# Patient Record
Sex: FEMALE | Race: WHITE | Employment: UNEMPLOYED | URBAN - METROPOLITAN AREA
[De-identification: names, ages, dates, MRNs, and addresses within clinical notes are randomized per-mention and may not be internally consistent; named-entity substitution may affect disease eponyms.]

---

## 2017-01-09 RX ORDER — ATORVASTATIN CALCIUM 10 MG/1
TABLET, FILM COATED ORAL
Qty: 30 TABLET | Refills: 11 | Status: SHIPPED | OUTPATIENT
Start: 2017-01-09 | End: 2017-05-01

## 2017-05-01 RX ORDER — ATORVASTATIN CALCIUM 20 MG/1
TABLET, FILM COATED ORAL
Qty: 30 TABLET | Refills: 2 | Status: SHIPPED | OUTPATIENT
Start: 2017-05-01 | End: 2017-07-13 | Stop reason: SDUPTHER

## 2017-05-01 RX ORDER — LEVOTHYROXINE SODIUM 88 UG/1
TABLET ORAL
Qty: 30 TABLET | Refills: 2 | Status: SHIPPED | OUTPATIENT
Start: 2017-05-01 | End: 2017-05-19 | Stop reason: SDUPTHER

## 2017-05-05 RX ORDER — NAPROXEN 500 MG/1
TABLET ORAL
Qty: 60 TABLET | Refills: 3 | OUTPATIENT
Start: 2017-05-05

## 2017-05-19 ENCOUNTER — OFFICE VISIT (OUTPATIENT)
Dept: FAMILY MEDICINE CLINIC | Age: 57
End: 2017-05-19
Payer: MEDICARE

## 2017-05-19 VITALS
TEMPERATURE: 97 F | BODY MASS INDEX: 25.42 KG/M2 | SYSTOLIC BLOOD PRESSURE: 121 MMHG | DIASTOLIC BLOOD PRESSURE: 79 MMHG | WEIGHT: 139 LBS | HEART RATE: 72 BPM | OXYGEN SATURATION: 99 %

## 2017-05-19 DIAGNOSIS — Z12.11 SCREEN FOR COLON CANCER: ICD-10-CM

## 2017-05-19 DIAGNOSIS — Z11.59 NEED FOR HEPATITIS C SCREENING TEST: ICD-10-CM

## 2017-05-19 DIAGNOSIS — R52 GENERALIZED PAIN: ICD-10-CM

## 2017-05-19 DIAGNOSIS — Z11.4 SCREENING FOR HIV (HUMAN IMMUNODEFICIENCY VIRUS): ICD-10-CM

## 2017-05-19 DIAGNOSIS — F17.200 CURRENT SMOKER: ICD-10-CM

## 2017-05-19 DIAGNOSIS — E03.9 HYPOTHYROIDISM, UNSPECIFIED TYPE: Primary | Chronic | ICD-10-CM

## 2017-05-19 DIAGNOSIS — Z00.00 HEALTH CARE MAINTENANCE: ICD-10-CM

## 2017-05-19 DIAGNOSIS — E78.00 HYPERCHOLESTEREMIA: ICD-10-CM

## 2017-05-19 DIAGNOSIS — J30.89 PERENNIAL ALLERGIC RHINITIS, UNSPECIFIED ALLERGIC RHINITIS TRIGGER: ICD-10-CM

## 2017-05-19 PROCEDURE — 99214 OFFICE O/P EST MOD 30 MIN: CPT | Performed by: INTERNAL MEDICINE

## 2017-05-19 RX ORDER — FLUTICASONE PROPIONATE 50 MCG
1 SPRAY, SUSPENSION (ML) NASAL DAILY
Qty: 1 BOTTLE | Refills: 3 | Status: SHIPPED | OUTPATIENT
Start: 2017-05-19 | End: 2017-10-27 | Stop reason: SDUPTHER

## 2017-05-19 RX ORDER — VARENICLINE TARTRATE 1 MG/1
1 TABLET, FILM COATED ORAL 2 TIMES DAILY
Qty: 60 TABLET | Refills: 3 | Status: SHIPPED | OUTPATIENT
Start: 2017-05-19 | End: 2017-07-13

## 2017-05-19 RX ORDER — NAPROXEN 500 MG/1
TABLET ORAL
Qty: 60 TABLET | Refills: 5 | Status: SHIPPED | OUTPATIENT
Start: 2017-05-19 | End: 2017-10-27 | Stop reason: SDUPTHER

## 2017-05-19 RX ORDER — VARENICLINE TARTRATE 25 MG
KIT ORAL
Qty: 42 TABLET | Refills: 0 | Status: SHIPPED | OUTPATIENT
Start: 2017-05-19 | End: 2017-07-13

## 2017-05-19 RX ORDER — LEVOTHYROXINE SODIUM 88 UG/1
TABLET ORAL
Qty: 30 TABLET | Refills: 5 | Status: SHIPPED | OUTPATIENT
Start: 2017-05-19 | End: 2017-10-27 | Stop reason: SDUPTHER

## 2017-05-19 ASSESSMENT — PATIENT HEALTH QUESTIONNAIRE - PHQ9
SUM OF ALL RESPONSES TO PHQ9 QUESTIONS 1 & 2: 0
1. LITTLE INTEREST OR PLEASURE IN DOING THINGS: 0
2. FEELING DOWN, DEPRESSED OR HOPELESS: 0
SUM OF ALL RESPONSES TO PHQ QUESTIONS 1-9: 0

## 2017-06-15 ENCOUNTER — HOSPITAL ENCOUNTER (OUTPATIENT)
Age: 57
Setting detail: SPECIMEN
Discharge: HOME | End: 2017-06-15
Payer: MEDICARE

## 2017-06-15 DIAGNOSIS — E78.00 HYPERCHOLESTEREMIA: ICD-10-CM

## 2017-06-15 DIAGNOSIS — Z00.00 HEALTH CARE MAINTENANCE: ICD-10-CM

## 2017-06-15 DIAGNOSIS — Z11.4 SCREENING FOR HIV (HUMAN IMMUNODEFICIENCY VIRUS): ICD-10-CM

## 2017-06-15 DIAGNOSIS — E03.9 HYPOTHYROIDISM, UNSPECIFIED TYPE: Chronic | ICD-10-CM

## 2017-06-15 DIAGNOSIS — Z11.59 NEED FOR HEPATITIS C SCREENING TEST: ICD-10-CM

## 2017-06-15 LAB
CHOLESTEROL/HDL RATIO: 6.3
CHOLESTEROL: 301 MG/DL
HDLC SERPL-MCNC: 48 MG/DL
HEPATITIS C ANTIBODY: NONREACTIVE
HIV AG/AB: NONREACTIVE
LDL CHOLESTEROL: 187 MG/DL (ref 0–130)
TRIGL SERPL-MCNC: 330 MG/DL
TSH SERPL DL<=0.05 MIU/L-ACNC: 2.39 MIU/L (ref 0.3–5)
VLDLC SERPL CALC-MCNC: ABNORMAL MG/DL (ref 1–30)

## 2017-06-16 ENCOUNTER — TELEPHONE (OUTPATIENT)
Dept: FAMILY MEDICINE CLINIC | Age: 57
End: 2017-06-16

## 2017-06-16 LAB — VITAMIN D 1,25-DIHYDROXY: 63.5 PG/ML (ref 19.9–79.3)

## 2017-07-13 ENCOUNTER — HOSPITAL ENCOUNTER (OUTPATIENT)
Age: 57
Setting detail: SPECIMEN
Discharge: HOME | End: 2017-07-13
Payer: MEDICARE

## 2017-07-13 ENCOUNTER — OFFICE VISIT (OUTPATIENT)
Dept: FAMILY MEDICINE CLINIC | Age: 57
End: 2017-07-13
Payer: MEDICARE

## 2017-07-13 VITALS
OXYGEN SATURATION: 98 % | HEIGHT: 61 IN | HEART RATE: 72 BPM | TEMPERATURE: 97.1 F | SYSTOLIC BLOOD PRESSURE: 116 MMHG | BODY MASS INDEX: 26.01 KG/M2 | DIASTOLIC BLOOD PRESSURE: 77 MMHG | WEIGHT: 137.8 LBS

## 2017-07-13 DIAGNOSIS — Z01.419 ENCOUNTER FOR WELL WOMAN EXAM WITH ROUTINE GYNECOLOGICAL EXAM: ICD-10-CM

## 2017-07-13 DIAGNOSIS — E78.00 HYPERCHOLESTEREMIA: ICD-10-CM

## 2017-07-13 DIAGNOSIS — Z12.39 SCREENING FOR BREAST CANCER: ICD-10-CM

## 2017-07-13 DIAGNOSIS — E03.9 ACQUIRED HYPOTHYROIDISM: ICD-10-CM

## 2017-07-13 DIAGNOSIS — Z00.00 HEALTH CARE MAINTENANCE: ICD-10-CM

## 2017-07-13 DIAGNOSIS — Z00.00 WELL ADULT EXAM: Primary | ICD-10-CM

## 2017-07-13 PROCEDURE — 99396 PREV VISIT EST AGE 40-64: CPT | Performed by: INTERNAL MEDICINE

## 2017-07-13 RX ORDER — ATORVASTATIN CALCIUM 10 MG/1
10 TABLET, FILM COATED ORAL DAILY
Qty: 30 TABLET | Refills: 3 | Status: SHIPPED | OUTPATIENT
Start: 2017-07-13 | End: 2017-10-27 | Stop reason: SDUPTHER

## 2017-07-17 LAB
HPV SAMPLE: NORMAL
HPV SOURCE: NORMAL
HPV, GENOTYPE 16: NOT DETECTED
HPV, GENOTYPE 18: NOT DETECTED
HPV, HIGH RISK OTHER: NOT DETECTED
HPV, INTERPRETATION: NORMAL

## 2017-07-18 LAB — CYTOLOGY REPORT: NORMAL

## 2017-07-26 RX ORDER — ATORVASTATIN CALCIUM 20 MG/1
TABLET, FILM COATED ORAL
Qty: 30 TABLET | Refills: 2 | OUTPATIENT
Start: 2017-07-26

## 2017-09-11 ENCOUNTER — HOSPITAL ENCOUNTER (OUTPATIENT)
Dept: WOMENS IMAGING | Age: 57
Discharge: HOME | End: 2017-09-11
Payer: MEDICARE

## 2017-09-11 DIAGNOSIS — Z12.39 SCREENING FOR BREAST CANCER: ICD-10-CM

## 2017-09-11 PROCEDURE — 77063 BREAST TOMOSYNTHESIS BI: CPT

## 2017-10-27 ENCOUNTER — OFFICE VISIT (OUTPATIENT)
Dept: FAMILY MEDICINE CLINIC | Age: 57
End: 2017-10-27
Payer: MEDICARE

## 2017-10-27 VITALS
BODY MASS INDEX: 26.2 KG/M2 | SYSTOLIC BLOOD PRESSURE: 102 MMHG | HEART RATE: 80 BPM | OXYGEN SATURATION: 96 % | WEIGHT: 139.8 LBS | DIASTOLIC BLOOD PRESSURE: 62 MMHG

## 2017-10-27 DIAGNOSIS — E78.00 HYPERCHOLESTEREMIA: ICD-10-CM

## 2017-10-27 DIAGNOSIS — M65.4 DE QUERVAIN'S TENOSYNOVITIS, RIGHT: ICD-10-CM

## 2017-10-27 DIAGNOSIS — S86.812A STRAIN OF LEFT PATELLAR TENDON, INITIAL ENCOUNTER: ICD-10-CM

## 2017-10-27 DIAGNOSIS — R52 GENERALIZED PAIN: ICD-10-CM

## 2017-10-27 DIAGNOSIS — E03.9 HYPOTHYROIDISM, UNSPECIFIED TYPE: Chronic | ICD-10-CM

## 2017-10-27 DIAGNOSIS — S63.501D SPRAIN OF RIGHT WRIST, SUBSEQUENT ENCOUNTER: Primary | ICD-10-CM

## 2017-10-27 PROCEDURE — G8427 DOCREV CUR MEDS BY ELIG CLIN: HCPCS | Performed by: INTERNAL MEDICINE

## 2017-10-27 PROCEDURE — G8484 FLU IMMUNIZE NO ADMIN: HCPCS | Performed by: INTERNAL MEDICINE

## 2017-10-27 PROCEDURE — 3014F SCREEN MAMMO DOC REV: CPT | Performed by: INTERNAL MEDICINE

## 2017-10-27 PROCEDURE — 4004F PT TOBACCO SCREEN RCVD TLK: CPT | Performed by: INTERNAL MEDICINE

## 2017-10-27 PROCEDURE — G8417 CALC BMI ABV UP PARAM F/U: HCPCS | Performed by: INTERNAL MEDICINE

## 2017-10-27 PROCEDURE — 99214 OFFICE O/P EST MOD 30 MIN: CPT | Performed by: INTERNAL MEDICINE

## 2017-10-27 PROCEDURE — 3017F COLORECTAL CA SCREEN DOC REV: CPT | Performed by: INTERNAL MEDICINE

## 2017-10-27 RX ORDER — LEVOTHYROXINE SODIUM 88 UG/1
TABLET ORAL
Qty: 30 TABLET | Refills: 5 | Status: SHIPPED | OUTPATIENT
Start: 2017-10-27 | End: 2018-08-21 | Stop reason: SDUPTHER

## 2017-10-27 RX ORDER — NAPROXEN 500 MG/1
TABLET ORAL
Qty: 60 TABLET | Refills: 5 | Status: SHIPPED | OUTPATIENT
Start: 2017-10-27 | End: 2018-07-03 | Stop reason: SDUPTHER

## 2017-10-27 RX ORDER — ATORVASTATIN CALCIUM 10 MG/1
10 TABLET, FILM COATED ORAL DAILY
Qty: 30 TABLET | Refills: 5 | Status: SHIPPED | OUTPATIENT
Start: 2017-10-27 | End: 2018-08-21 | Stop reason: SDUPTHER

## 2017-10-27 RX ORDER — FLUTICASONE PROPIONATE 50 MCG
1 SPRAY, SUSPENSION (ML) NASAL DAILY
Qty: 1 BOTTLE | Refills: 5 | Status: SHIPPED | OUTPATIENT
Start: 2017-10-27 | End: 2018-08-30 | Stop reason: SDUPTHER

## 2017-10-27 NOTE — PROGRESS NOTES
Dispense: 30 tablet; Refill: 5    5. Hypothyroidism, unspecified type  - levothyroxine (SYNTHROID) 88 MCG tablet; take 1 tablet by mouth once daily  Dispense: 30 tablet; Refill: 5    6. Generalized pain  - naproxen (NAPROSYN) 500 MG tablet; take 1 tablet by mouth twice a day with food  Dispense: 60 tablet;  Refill: 5           Electronically signed by Nitin Lambert MD on 10/27/2017 at 3:30 PM

## 2017-10-27 NOTE — PATIENT INSTRUCTIONS
Josselyn Messier Disease: Exercises  Your Care Instructions  Here are some examples of typical rehabilitation exercises for your condition. Start each exercise slowly. Ease off the exercise if you start to have pain. Your doctor or your physical or occupational therapist will tell you when you can start these exercises and which ones will work best for you. How to do the exercises  Thumb lifts    1. Place your hand on a flat surface, with your palm up. 2. Lift your thumb away from your palm to make a \"C\" shape. 3. Hold for about 6 seconds. 4. Repeat 8 to 12 times. Passive thumb MP flexion    1. Hold your hand in front of you, and turn your hand so your little finger faces down and your thumb faces up. (Your hand should be in the position used for shaking someone's hand.) You may also rest your hand on a flat surface. 2. Use the fingers on your other hand to bend your thumb down at the point where your thumb connects to your palm. 3. Hold for at least 15 to 30 seconds. 4. Repeat 2 to 4 times. Finkelstein stretch    1. Hold your arms out in front of you. (Your hand should be in the position used for shaking someone's hand.)  2. Bend your thumb toward your palm. 3. Use your other hand to gently stretch your thumb and wrist downward until you feel the stretch on the thumb side of your wrist.  4. Hold for at least 15 to 30 seconds. 5. Repeat 2 to 4 times. Resisted ulnar deviation    Note: For this exercise, you will need elastic exercise material, such as surgical tubing or Thera-Band. 1. Sit leaning forward with your legs slightly spread and your elbow on your thigh. 2. Grasp one end of the band with your palm down, and step on the other end with the foot opposite the hand holding the band. 3. Slowly bend your wrist sideways and away from your knee. 4. Repeat 8 to 12 times. Follow-up care is a key part of your treatment and safety.  Be sure to make and go to all appointments, and call your doctor if leaning forward with your legs slightly spread. Then place your forearm on your thigh with your affected hand and wrist in front of your knee. 7. Grasp one end of an exercise band with your hand facing toward your other thigh. Step on the other end.  8. Slowly bend your wrist upward for a count of 2. Then lower your wrist slowly to a count of 5.  9. Repeat 8 to 12 times. Resisted ulnar deviation    5. Sit leaning forward with your legs slightly spread. Then place your forearm on your thigh with your affected hand and wrist by the inside of your knee. 6. Grasp one end of an exercise band with your palm down. Step on the other end with the foot opposite the hand holding the band. 7. Slowly bend your wrist outward and toward your knee for a count of 2. Then slowly move your wrist back to the starting position to a count of 5.  8. Repeat 8 to 12 times. Resisted forearm pronation    1. Sit leaning forward with your legs slightly spread. Then place your forearm on your thigh with your affected hand and wrist in front of your knee. 2. Grasp one end of an exercise band with your palm up. Step on the other end. 3. Keeping your wrist straight, roll your palm inward toward your thigh for a count of 2. Then slowly move your wrist back to the starting position to a count of 5.  4. Repeat 8 to 12 times. Resisted supination    1. Sit leaning forward with your legs slightly spread. Then place your forearm on your thigh with your affected hand and wrist in front of your knee. 2. Grasp one end of an exercise band with your palm down. Step on the other end. 3. Keeping your wrist straight, roll your palm outward and away from your thigh for a count of 2. Then slowly move your wrist back to the starting position to a count of 5.  4. Repeat 8 to 12 times. Follow-up care is a key part of your treatment and safety. Be sure to make and go to all appointments, and call your doctor if you are having problems.  It's also a good idea large rolled-up towel and your heels on the floor. 10. Lift the lower part of your affected leg until your leg is straight. Keep the back of your knee on the foam roll or towel. 11. Hold your leg straight for about 6 seconds, then slowly bend your knee and lower your heel back to the floor. Rest for up to 10 seconds between repetitions. 12. Repeat 8 to 12 times. Half-squat with knees and feet turned out to the side    10. Stand with your feet about shoulder-width apart and turned out to the side about 45 degrees. 11. Keep your back straight, and tighten your buttocks. 12. Slowly bend your knees to lower your body about one-quarter of the way down toward the floor. Try to keep your back straight at all times, and do not let your pelvis tilt forward or your knees extend beyond the tip of your toes. 13. Repeat 8 to 12 times. Step up    9. Place a single-step footstool on the floor. If you do not have a footstool, you can use a thick book, such as a phone book, a dictionary, or an encyclopedia. If you are not steady on your feet, hold on to a chair, counter, or wall while you do this exercise. 10. Keeping your back straight, step up with your affected leg. Try not to push off your back leg as you step up. Only use your affected leg to bring you up on to the step. Then lift your other leg on to the step. As you step up, try to keep your knee moving in a straight line with your middle toe. 11. Move back to the starting position, with both feet on the floor. 12. Repeat 8 to 12 times. Step down    5. Stand on a single-step footstool. If you do not have a footstool, you can use a thick book, such as a phone book, a dictionary, or an encyclopedia. If you are not steady on your feet, hold on to a chair, counter, or wall while you do this exercise. 6. Slowly step down with your good leg, allowing your heel to lightly touch the floor.  As you step down, try to keep your affected knee moving in a straight line toward

## 2018-02-26 ENCOUNTER — OFFICE VISIT (OUTPATIENT)
Dept: FAMILY MEDICINE CLINIC | Age: 58
End: 2018-02-26
Payer: MEDICARE

## 2018-02-26 VITALS
HEART RATE: 74 BPM | OXYGEN SATURATION: 98 % | BODY MASS INDEX: 25.95 KG/M2 | HEIGHT: 62 IN | WEIGHT: 141 LBS | SYSTOLIC BLOOD PRESSURE: 122 MMHG | DIASTOLIC BLOOD PRESSURE: 82 MMHG | TEMPERATURE: 98.2 F

## 2018-02-26 DIAGNOSIS — B96.89 ACUTE BACTERIAL BRONCHITIS: Primary | ICD-10-CM

## 2018-02-26 DIAGNOSIS — R05.8 COUGH PRODUCTIVE OF PURULENT SPUTUM: ICD-10-CM

## 2018-02-26 DIAGNOSIS — J20.8 ACUTE BACTERIAL BRONCHITIS: Primary | ICD-10-CM

## 2018-02-26 PROCEDURE — 3017F COLORECTAL CA SCREEN DOC REV: CPT | Performed by: NURSE PRACTITIONER

## 2018-02-26 PROCEDURE — G8427 DOCREV CUR MEDS BY ELIG CLIN: HCPCS | Performed by: NURSE PRACTITIONER

## 2018-02-26 PROCEDURE — G8417 CALC BMI ABV UP PARAM F/U: HCPCS | Performed by: NURSE PRACTITIONER

## 2018-02-26 PROCEDURE — 99213 OFFICE O/P EST LOW 20 MIN: CPT | Performed by: NURSE PRACTITIONER

## 2018-02-26 PROCEDURE — G8484 FLU IMMUNIZE NO ADMIN: HCPCS | Performed by: NURSE PRACTITIONER

## 2018-02-26 PROCEDURE — 3014F SCREEN MAMMO DOC REV: CPT | Performed by: NURSE PRACTITIONER

## 2018-02-26 PROCEDURE — 4004F PT TOBACCO SCREEN RCVD TLK: CPT | Performed by: NURSE PRACTITIONER

## 2018-02-26 RX ORDER — AZITHROMYCIN 250 MG/1
TABLET, FILM COATED ORAL
Qty: 1 PACKET | Refills: 0 | Status: SHIPPED | OUTPATIENT
Start: 2018-02-26 | End: 2018-08-09

## 2018-02-26 RX ORDER — GUAIFENESIN 600 MG/1
600 TABLET, EXTENDED RELEASE ORAL 2 TIMES DAILY
Qty: 20 TABLET | Refills: 0 | Status: SHIPPED | OUTPATIENT
Start: 2018-02-26 | End: 2018-08-09 | Stop reason: ALTCHOICE

## 2018-02-26 ASSESSMENT — ENCOUNTER SYMPTOMS
SHORTNESS OF BREATH: 0
RHINORRHEA: 1
COUGH: 1
SORE THROAT: 1

## 2018-02-26 NOTE — PROGRESS NOTES
Patient is present complaining of a cough x 1 month. Yellow-green phlem  Patient is also complaining of a sore throat x 2 days. Patient has tried many different OTC medications with no relief. Patient states pain level is a 0/10. Medications and pharmacy reviewed with patient      Visit Information    Have you changed or started any medications since your last visit including any over-the-counter medicines, vitamins, or herbal medicines? no   Have you stopped taking any of your medications? Is so, why? -  no  Are you having any side effects from any of your medications? - no    Have you seen any other physician or provider since your last visit?  no   Have you had any other diagnostic tests since your last visit?  no   Have you been seen in the emergency room and/or had an admission in a hospital since we last saw you?  no   Have you had your routine dental cleaning in the past 6 months?  no     Do you have an active MyChart account? If no, what is the barrier?   No:     Patient Care Team:  Luis Alvares MD as PCP - General (Internal Medicine)    Medical History Review  Past Medical, Family, and Social History reviewed and does contribute to the patient presenting condition    Health Maintenance   Topic Date Due    DTaP/Tdap/Td vaccine (1 - Tdap) 10/23/1981    Pneumococcal med risk (1 of 1 - PPSV23) 10/23/1981    Shingles Vaccine (1 of 2 - 2 Dose Series) 10/23/2012    Colon cancer screen colonoscopy  10/23/2012    Flu vaccine (1) 09/01/2017    TSH testing  06/15/2018    Breast cancer screen  09/11/2019    Lipid screen  06/15/2022    Cervical cancer screen  07/13/2022    Hepatitis C screen  Completed    HIV screen  Completed

## 2018-02-26 NOTE — PROGRESS NOTES
[address] [place]  [phone]    Judythe Runner is a 56 y.o. female who presents today for her  medical conditions/complaints as noted below. Judythe Runner is c/o of Cough (x 1 month) and Pharyngitis (x 2 days)    HPI:     HPI   Cough and illness has been waxing and waning. Aching and fatigue. No fever. States she is brinign up greenish yellow sputum. States her chest was congested and heavy until a week ago. She has tried over the countrer meds but she does not know what they are. They help until they wear off. She is smoking half a pack a day. When her chest was congested she had a dry cough, now she has sputum production. Nursing note reviewed and discussed with patient. Patient's medications, allergies, past medical, surgical, social and family histories were reviewed and updated as appropriate. Current Outpatient Prescriptions on File Prior to Visit   Medication Sig Dispense Refill    atorvastatin (LIPITOR) 10 MG tablet Take 1 tablet by mouth daily 30 tablet 5    levothyroxine (SYNTHROID) 88 MCG tablet take 1 tablet by mouth once daily 30 tablet 5    naproxen (NAPROSYN) 500 MG tablet take 1 tablet by mouth twice a day with food 60 tablet 5    fluticasone (FLONASE) 50 MCG/ACT nasal spray 1 spray by Nasal route daily 1 Bottle 5     No current facility-administered medications on file prior to visit.       Past Medical History:   Diagnosis Date    Hyperlipidemia     Hypothyroidism       Past Surgical History:   Procedure Laterality Date    CERVICAL SPINE SURGERY      HYSTERECTOMY      partial     Family History   Problem Relation Age of Onset    Heart Disease Mother     Cancer Mother      stomach breast    Kidney Disease Sister     High Blood Pressure Sister     High Blood Pressure Brother      Social History   Substance Use Topics    Smoking status: Current Every Day Smoker     Packs/day: 0.50     Years: 34.00 Psychiatric: She has a normal mood and affect. Her behavior is normal. Judgment and thought content normal.     Assessment/PLAN     1. Acute bacterial bronchitis  Will give antibiotic due to color of sputum. Duration of symptoms and cough resolving last discussed  Needs to increase water  Continue flonase  Quit smoking.   - azithromycin (ZITHROMAX) 250 MG tablet; Take 2 tabs (500 mg) on Day 1, and take 1 tab (250 mg) on days 2 through 5. Dispense: 1 packet; Refill: 0  - guaiFENesin (MUCINEX) 600 MG extended release tablet; Take 1 tablet by mouth 2 times daily  Dispense: 20 tablet; Refill: 0    2. Cough productive of purulent sputum    - azithromycin (ZITHROMAX) 250 MG tablet; Take 2 tabs (500 mg) on Day 1, and take 1 tab (250 mg) on days 2 through 5. Dispense: 1 packet; Refill: 0  - guaiFENesin (MUCINEX) 600 MG extended release tablet; Take 1 tablet by mouth 2 times daily  Dispense: 20 tablet; Refill: 0      RTO if symptoms worsen or fail to improve  Pt agreeable with plan      Patient given educational materials - see patient instructions. Discussed use, benefit, and side effects of prescribed medications. All patient questions answered. Pt voiced understanding. Reviewed health maintenance. Instructed to continue current medications, diet and exercise. 1.  Smooth Coyle received counseling on the following healthy behaviors: nutrition, exercise and medication adherence. Tobacco cessation. 2.  Patient given educational materials when available - see patient instructions when applicable  3. Discussed use, benefit, and side effects of prescribed medications. Barriers to medication compliance addressed. All patient questions answered. Pt voiced understanding. 4.  Reviewed prior labs and health maintenance  5. Continue current medications, diet and exercise.     Completed Refills   Requested Prescriptions     Signed Prescriptions Disp Refills    azithromycin (ZITHROMAX) 250 MG tablet 1 packet 0     Sig:

## 2018-03-13 ENCOUNTER — OFFICE VISIT (OUTPATIENT)
Dept: FAMILY MEDICINE CLINIC | Age: 58
End: 2018-03-13
Payer: MEDICARE

## 2018-03-13 VITALS
WEIGHT: 137 LBS | HEIGHT: 62 IN | HEART RATE: 116 BPM | OXYGEN SATURATION: 93 % | DIASTOLIC BLOOD PRESSURE: 78 MMHG | BODY MASS INDEX: 25.21 KG/M2 | SYSTOLIC BLOOD PRESSURE: 128 MMHG | TEMPERATURE: 99.3 F | RESPIRATION RATE: 20 BRPM

## 2018-03-13 DIAGNOSIS — R05.3 PERSISTENT COUGH: ICD-10-CM

## 2018-03-13 DIAGNOSIS — J01.90 ACUTE BACTERIAL SINUSITIS: Primary | ICD-10-CM

## 2018-03-13 DIAGNOSIS — B96.89 ACUTE BACTERIAL SINUSITIS: Primary | ICD-10-CM

## 2018-03-13 PROCEDURE — 4004F PT TOBACCO SCREEN RCVD TLK: CPT | Performed by: NURSE PRACTITIONER

## 2018-03-13 PROCEDURE — 3017F COLORECTAL CA SCREEN DOC REV: CPT | Performed by: NURSE PRACTITIONER

## 2018-03-13 PROCEDURE — 3014F SCREEN MAMMO DOC REV: CPT | Performed by: NURSE PRACTITIONER

## 2018-03-13 PROCEDURE — G8417 CALC BMI ABV UP PARAM F/U: HCPCS | Performed by: NURSE PRACTITIONER

## 2018-03-13 PROCEDURE — G8484 FLU IMMUNIZE NO ADMIN: HCPCS | Performed by: NURSE PRACTITIONER

## 2018-03-13 PROCEDURE — G8427 DOCREV CUR MEDS BY ELIG CLIN: HCPCS | Performed by: NURSE PRACTITIONER

## 2018-03-13 PROCEDURE — 99213 OFFICE O/P EST LOW 20 MIN: CPT | Performed by: NURSE PRACTITIONER

## 2018-03-13 RX ORDER — AMOXICILLIN AND CLAVULANATE POTASSIUM 875; 125 MG/1; MG/1
1 TABLET, FILM COATED ORAL 2 TIMES DAILY
Qty: 20 TABLET | Refills: 0 | Status: SHIPPED | OUTPATIENT
Start: 2018-03-13 | End: 2018-03-23

## 2018-03-13 NOTE — PROGRESS NOTES
 Cancer Mother      stomach breast    Kidney Disease Sister     High Blood Pressure Sister     High Blood Pressure Brother      Social History   Substance Use Topics    Smoking status: Current Every Day Smoker     Packs/day: 0.50     Years: 34.00     Types: Cigarettes     Last attempt to quit: 6/7/2011    Smokeless tobacco: Never Used    Alcohol use No      No Known Allergies    Subjective:      Review of Systems   Constitutional: Positive for activity change, fatigue and fever. Negative for chills. HENT: Positive for congestion, rhinorrhea, sinus pain and sinus pressure. Respiratory: Positive for cough and wheezing. Negative for shortness of breath. Cardiovascular: Negative for chest pain, palpitations and leg swelling. Musculoskeletal: Positive for myalgias. Other pertinent ROS in HPI  Objective:     /78 (Site: Left Arm, Position: Sitting, Cuff Size: Medium Adult)   Pulse 116   Temp 99.3 °F (37.4 °C) (Oral)   Resp 20   Ht 5' 2.01\" (1.575 m)   Wt 137 lb (62.1 kg)   SpO2 93%   BMI 25.05 kg/m²    Wt Readings from Last 3 Encounters:   03/13/18 137 lb (62.1 kg)   02/26/18 141 lb (64 kg)   10/27/17 139 lb 12.8 oz (63.4 kg)       Physical Exam   Constitutional: She is oriented to person, place, and time. She appears well-developed and well-nourished. No distress. HENT:   Head: Normocephalic and atraumatic. Right Ear: Hearing, tympanic membrane, external ear and ear canal normal.   Left Ear: Hearing, tympanic membrane, external ear and ear canal normal.   Nose: Nose normal.   Mouth/Throat: Oropharynx is clear and moist.   Eyes: Conjunctivae and EOM are normal. Pupils are equal, round, and reactive to light. Neck: Trachea normal, normal range of motion and full passive range of motion without pain. No thyroid mass present. Cardiovascular: Normal rate, regular rhythm, S1 normal, S2 normal and normal heart sounds. Exam reveals no distant heart sounds and no friction rub. Pulmonary/Chest: Effort normal and breath sounds normal. No accessory muscle usage. No respiratory distress. Abdominal: Soft. Bowel sounds are normal. She exhibits no distension, no ascites and no mass. Musculoskeletal: Normal range of motion. Pain free ROM     Lymphadenopathy:     She has no cervical adenopathy. Neurological: She is oriented to person, place, and time. Gait is normal.   Skin: Skin is warm and dry. No rash noted. She is not diaphoretic. Psychiatric: She has a normal mood and affect. Her behavior is normal. Judgment and thought content normal.     Assessment/PLAN     1. Acute bacterial sinusitis    - amoxicillin-clavulanate (AUGMENTIN) 875-125 MG per tablet; Take 1 tablet by mouth 2 times daily for 10 days  Dispense: 20 tablet; Refill: 0    2. Persistent cough    - XR CHEST STANDARD (2 VW); Future      RTO if symptoms worsen or fail to improve  Pt agreeable with plan      Patient given educational materials - see patient instructions. Discussed use, benefit, and side effects of prescribed medications. All patient questions answered. Pt voiced understanding. Reviewed health maintenance. Instructed to continue current medications, diet and exercise. 1.  Jakub Chavez received counseling on the following healthy behaviors: medication adherence and tobacco cessation  2. Patient given educational materials when available - see patient instructions when applicable  3. Discussed use, benefit, and side effects of prescribed medications. Barriers to medication compliance addressed. All patient questions answered. Pt voiced understanding. 4.  Reviewed prior labs and health maintenance  5. Continue current medications, diet and exercise.     Completed Refills   Requested Prescriptions     Signed Prescriptions Disp Refills    amoxicillin-clavulanate (AUGMENTIN) 875-125 MG per tablet 20 tablet 0     Sig: Take 1 tablet by mouth 2 times daily for 10 days         Electronically signed by Marisol Shah CNP on 3/14/2018 at 10:28 AM

## 2018-03-14 ASSESSMENT — ENCOUNTER SYMPTOMS
WHEEZING: 1
RHINORRHEA: 1
COUGH: 1
SINUS PAIN: 1
SINUS PRESSURE: 1
SHORTNESS OF BREATH: 0

## 2018-07-03 DIAGNOSIS — E03.9 HYPOTHYROIDISM, UNSPECIFIED TYPE: Chronic | ICD-10-CM

## 2018-07-03 DIAGNOSIS — Z13.0 SCREENING FOR DEFICIENCY ANEMIA: ICD-10-CM

## 2018-07-03 DIAGNOSIS — E78.00 HYPERCHOLESTEREMIA: Primary | ICD-10-CM

## 2018-07-03 DIAGNOSIS — S63.501D SPRAIN OF RIGHT WRIST, SUBSEQUENT ENCOUNTER: ICD-10-CM

## 2018-07-03 DIAGNOSIS — R52 GENERALIZED PAIN: ICD-10-CM

## 2018-07-03 DIAGNOSIS — M65.4 DE QUERVAIN'S TENOSYNOVITIS, RIGHT: ICD-10-CM

## 2018-07-03 DIAGNOSIS — S86.812A STRAIN OF LEFT PATELLAR TENDON, INITIAL ENCOUNTER: ICD-10-CM

## 2018-07-05 RX ORDER — NAPROXEN 500 MG/1
TABLET ORAL
Qty: 60 TABLET | Refills: 5 | Status: SHIPPED | OUTPATIENT
Start: 2018-07-05 | End: 2018-12-23 | Stop reason: SDUPTHER

## 2018-07-05 RX ORDER — LEVOTHYROXINE SODIUM 88 UG/1
TABLET ORAL
Qty: 90 TABLET | Refills: 1 | OUTPATIENT
Start: 2018-07-05

## 2018-08-02 ENCOUNTER — HOSPITAL ENCOUNTER (OUTPATIENT)
Age: 58
Setting detail: SPECIMEN
Discharge: HOME | End: 2018-08-02
Payer: MEDICARE

## 2018-08-02 DIAGNOSIS — Z13.0 SCREENING FOR DEFICIENCY ANEMIA: ICD-10-CM

## 2018-08-02 DIAGNOSIS — E78.00 HYPERCHOLESTEREMIA: ICD-10-CM

## 2018-08-02 DIAGNOSIS — E03.9 HYPOTHYROIDISM, UNSPECIFIED TYPE: Chronic | ICD-10-CM

## 2018-08-02 LAB
ABSOLUTE EOS #: 0.16 K/UL (ref 0–0.44)
ABSOLUTE IMMATURE GRANULOCYTE: 0.03 K/UL (ref 0–0.3)
ABSOLUTE LYMPH #: 1.72 K/UL (ref 1.1–3.7)
ABSOLUTE MONO #: 0.62 K/UL (ref 0.1–1.2)
ANION GAP SERPL CALCULATED.3IONS-SCNC: 14 MMOL/L (ref 9–17)
BASOPHILS # BLD: 1 % (ref 0–2)
BASOPHILS ABSOLUTE: 0.1 K/UL (ref 0–0.2)
BUN BLDV-MCNC: 12 MG/DL (ref 6–20)
BUN/CREAT BLD: ABNORMAL (ref 9–20)
CALCIUM SERPL-MCNC: 9.4 MG/DL (ref 8.6–10.4)
CHLORIDE BLD-SCNC: 103 MMOL/L (ref 98–107)
CHOLESTEROL/HDL RATIO: 3.7
CHOLESTEROL: 243 MG/DL
CO2: 25 MMOL/L (ref 20–31)
CREAT SERPL-MCNC: 0.79 MG/DL (ref 0.5–0.9)
DIFFERENTIAL TYPE: ABNORMAL
EOSINOPHILS RELATIVE PERCENT: 2 % (ref 1–4)
GFR AFRICAN AMERICAN: >60 ML/MIN
GFR NON-AFRICAN AMERICAN: >60 ML/MIN
GFR SERPL CREATININE-BSD FRML MDRD: ABNORMAL ML/MIN/{1.73_M2}
GFR SERPL CREATININE-BSD FRML MDRD: ABNORMAL ML/MIN/{1.73_M2}
GLUCOSE BLD-MCNC: 87 MG/DL (ref 70–99)
HCT VFR BLD CALC: 49.9 % (ref 36.3–47.1)
HDLC SERPL-MCNC: 65 MG/DL
HEMOGLOBIN: 16.1 G/DL (ref 11.9–15.1)
IMMATURE GRANULOCYTES: 0 %
LDL CHOLESTEROL: 136 MG/DL (ref 0–130)
LYMPHOCYTES # BLD: 24 % (ref 24–43)
MCH RBC QN AUTO: 31.9 PG (ref 25.2–33.5)
MCHC RBC AUTO-ENTMCNC: 32.3 G/DL (ref 28.4–34.8)
MCV RBC AUTO: 98.8 FL (ref 82.6–102.9)
MONOCYTES # BLD: 9 % (ref 3–12)
NRBC AUTOMATED: 0 PER 100 WBC
PDW BLD-RTO: 13.9 % (ref 11.8–14.4)
PLATELET # BLD: 227 K/UL (ref 138–453)
PLATELET ESTIMATE: ABNORMAL
PMV BLD AUTO: 10.8 FL (ref 8.1–13.5)
POTASSIUM SERPL-SCNC: 5.4 MMOL/L (ref 3.7–5.3)
RBC # BLD: 5.05 M/UL (ref 3.95–5.11)
RBC # BLD: ABNORMAL 10*6/UL
SEG NEUTROPHILS: 64 % (ref 36–65)
SEGMENTED NEUTROPHILS ABSOLUTE COUNT: 4.44 K/UL (ref 1.5–8.1)
SODIUM BLD-SCNC: 142 MMOL/L (ref 135–144)
TRIGL SERPL-MCNC: 212 MG/DL
TSH SERPL DL<=0.05 MIU/L-ACNC: 17.69 MIU/L (ref 0.3–5)
VLDLC SERPL CALC-MCNC: ABNORMAL MG/DL (ref 1–30)
WBC # BLD: 7.1 K/UL (ref 3.5–11.3)
WBC # BLD: ABNORMAL 10*3/UL

## 2018-08-21 ENCOUNTER — OFFICE VISIT (OUTPATIENT)
Dept: FAMILY MEDICINE CLINIC | Age: 58
End: 2018-08-21
Payer: MEDICARE

## 2018-08-21 VITALS
WEIGHT: 141.4 LBS | RESPIRATION RATE: 16 BRPM | BODY MASS INDEX: 25.86 KG/M2 | OXYGEN SATURATION: 98 % | SYSTOLIC BLOOD PRESSURE: 108 MMHG | HEART RATE: 67 BPM | TEMPERATURE: 97.7 F | DIASTOLIC BLOOD PRESSURE: 70 MMHG

## 2018-08-21 DIAGNOSIS — F17.200 CURRENT SMOKER: ICD-10-CM

## 2018-08-21 DIAGNOSIS — E78.00 HYPERCHOLESTEREMIA: ICD-10-CM

## 2018-08-21 DIAGNOSIS — R52 GENERALIZED PAIN: ICD-10-CM

## 2018-08-21 DIAGNOSIS — Z72.0 TOBACCO ABUSE: ICD-10-CM

## 2018-08-21 DIAGNOSIS — E87.5 SERUM POTASSIUM ELEVATED: ICD-10-CM

## 2018-08-21 DIAGNOSIS — E03.9 HYPOTHYROIDISM, UNSPECIFIED TYPE: Chronic | ICD-10-CM

## 2018-08-21 DIAGNOSIS — H10.13 ALLERGIC CONJUNCTIVITIS OF BOTH EYES: ICD-10-CM

## 2018-08-21 DIAGNOSIS — J01.00 ACUTE NON-RECURRENT MAXILLARY SINUSITIS: Primary | ICD-10-CM

## 2018-08-21 PROCEDURE — 3017F COLORECTAL CA SCREEN DOC REV: CPT | Performed by: INTERNAL MEDICINE

## 2018-08-21 PROCEDURE — G8427 DOCREV CUR MEDS BY ELIG CLIN: HCPCS | Performed by: INTERNAL MEDICINE

## 2018-08-21 PROCEDURE — G8417 CALC BMI ABV UP PARAM F/U: HCPCS | Performed by: INTERNAL MEDICINE

## 2018-08-21 PROCEDURE — 99214 OFFICE O/P EST MOD 30 MIN: CPT | Performed by: INTERNAL MEDICINE

## 2018-08-21 PROCEDURE — 4004F PT TOBACCO SCREEN RCVD TLK: CPT | Performed by: INTERNAL MEDICINE

## 2018-08-21 RX ORDER — LEVOTHYROXINE SODIUM 88 UG/1
TABLET ORAL
Qty: 30 TABLET | Refills: 5 | Status: SHIPPED | OUTPATIENT
Start: 2018-08-21 | End: 2019-01-21 | Stop reason: SDUPTHER

## 2018-08-21 RX ORDER — OLOPATADINE HYDROCHLORIDE 1 MG/ML
1 SOLUTION/ DROPS OPHTHALMIC 2 TIMES DAILY
Qty: 1 BOTTLE | Refills: 3 | Status: SHIPPED | OUTPATIENT
Start: 2018-08-21 | End: 2018-09-20

## 2018-08-21 RX ORDER — ATORVASTATIN CALCIUM 10 MG/1
10 TABLET, FILM COATED ORAL DAILY
Qty: 30 TABLET | Refills: 5 | Status: SHIPPED | OUTPATIENT
Start: 2018-08-21 | End: 2019-03-20 | Stop reason: SDUPTHER

## 2018-08-21 RX ORDER — AMOXICILLIN AND CLAVULANATE POTASSIUM 875; 125 MG/1; MG/1
1 TABLET, FILM COATED ORAL 2 TIMES DAILY
Qty: 14 TABLET | Refills: 0 | Status: SHIPPED | OUTPATIENT
Start: 2018-08-21 | End: 2018-08-28

## 2018-08-21 RX ORDER — VARENICLINE TARTRATE 1 MG/1
1 TABLET, FILM COATED ORAL 2 TIMES DAILY
Qty: 60 TABLET | Refills: 3 | Status: SHIPPED | OUTPATIENT
Start: 2018-08-21 | End: 2019-03-20 | Stop reason: SINTOL

## 2018-08-21 RX ORDER — VARENICLINE TARTRATE 25 MG
KIT ORAL
Qty: 42 TABLET | Refills: 0 | Status: SHIPPED | OUTPATIENT
Start: 2018-08-21 | End: 2019-03-20 | Stop reason: SINTOL

## 2018-08-21 ASSESSMENT — PATIENT HEALTH QUESTIONNAIRE - PHQ9
2. FEELING DOWN, DEPRESSED OR HOPELESS: 0
SUM OF ALL RESPONSES TO PHQ9 QUESTIONS 1 & 2: 0
SUM OF ALL RESPONSES TO PHQ QUESTIONS 1-9: 0
1. LITTLE INTEREST OR PLEASURE IN DOING THINGS: 0
SUM OF ALL RESPONSES TO PHQ QUESTIONS 1-9: 0

## 2018-08-21 NOTE — PROGRESS NOTES
patient has been out of thyroid meds for about a month, maybe even longer. She did have the blood work done but no one ever called to tell her anything. Pt noticed she has been getting frequent HA. No concerns at this time. Visit Information    Have you changed or started any medications since your last visit including any over-the-counter medicines, vitamins, or herbal medicines? no   Have you stopped taking any of your medications? Is so, why? -  no  Are you having any side effects from any of your medications? - no    Have you seen any other physician or provider since your last visit?  no   Have you had any other diagnostic tests since your last visit?  no   Have you been seen in the emergency room and/or had an admission in a hospital since we last saw you?  no   Have you had your routine dental cleaning in the past 6 months?  no    Do you have an active MyChart account? If no, what is the barrier?   No: declined     Patient Care Team:  Yady Ness MD as PCP - General (Internal Medicine)  BETHANY Sheridan CNP as PCP - [facility] Attributed Provider    Medical History Review  Past Medical, Family, and Social History reviewed and does not contribute to the patient presenting condition    Health Maintenance   Topic Date Due    Shingles Vaccine (1 of 2 - 2 Dose Series) 10/23/2012    Colon cancer screen colonoscopy  10/23/2012    DTaP/Tdap/Td vaccine (1 - Tdap) 02/26/2019 (Originally 10/23/1981)    Flu vaccine (1) 02/26/2019 (Originally 9/1/2018)    Pneumococcal med risk (1 of 1 - PPSV23) 02/26/2019 (Originally 10/23/1981)    TSH testing  08/02/2019    Breast cancer screen  09/11/2019    Cervical cancer screen  07/13/2022    Lipid screen  08/02/2023    Hepatitis C screen  Completed    HIV screen  Completed

## 2018-08-21 NOTE — PROGRESS NOTES
Subjective:       Patient ID: March Less is a 56 y.o. female who presents for   Chief Complaint   Patient presents with    Results     f/u  labs 8/2/18       HPI:  Nursing note reviewed and discussed with patient. Headache - 3-4 times per week, top of head, throbbing pain, sometimes couple hours and sometimes lasts all day. Relieved by tylenol or naproxen. Sometimes wakes up with a headache at night. No photophobia, nausea, vomiting. Using flonase. She is upset that we would not provide her with refills on her thyroid medicine without labs - reviewed that she has had abnormal thyroid labs for a while now and because she reports she keeps running out, we're not sure that she is even on the right dose - so she will need to have repeat labs at least once more before she can get a years worth of refills. Additional Complaints:  Hypothyroidism  Patient complains of hypothyroidism. Current symptoms: change in energy level. Patient denies diarrhea, heat / cold intolerance, nervousness, palpitations and weight changes. Onset of symptoms was several years ago. Symptoms have waxed and waned but are worse overall since she ran out of meds. Patient's medications, allergies, past medical, surgical, social and family histories were reviewed and updated as appropriate. Social History   Substance Use Topics    Smoking status: Current Every Day Smoker     Packs/day: 0.50     Years: 34.00     Types: Cigarettes     Last attempt to quit: 6/7/2011    Smokeless tobacco: Never Used    Alcohol use No        Review of Systems  Energy level good overall, and weight is stable. No chest pain or shortness of breath.     Bowels have been normal without constipation or diarrhea         Objective:        Physical Exam:  /70 (Site: Right Arm, Position: Sitting, Cuff Size: Medium Adult)   Pulse 67   Temp 97.7 °F (36.5 °C) (Oral)   Resp 16   Wt 141 lb 6.4 oz (64.1 kg)   SpO2 98%   BMI 25.86 kg/m²     General: LDLCHOLESTEROL 187 (H) 06/15/2017    LDLCHOLESTEROL 119 07/29/2016     Lab Results   Component Value Date    VLDL NOT REPORTED 08/02/2018    VLDL NOT REPORTED 06/15/2017    VLDL NOT REPORTED 07/29/2016     Lab Results   Component Value Date    CHOLHDLRATIO 3.7 08/02/2018    CHOLHDLRATIO 6.3 (H) 06/15/2017    CHOLHDLRATIO 3.5 07/29/2016     Lab Results   Component Value Date    TSH 17.69 (H) 08/02/2018     Lab Results   Component Value Date     08/02/2018    K 5.4 (H) 08/02/2018     08/02/2018    CO2 25 08/02/2018    BUN 12 08/02/2018    CREATININE 0.79 08/02/2018    GLUCOSE 87 08/02/2018    CALCIUM 9.4 08/02/2018    PROT 7.1 11/06/2016    LABALBU 3.7 11/06/2016    BILITOT 0.45 11/06/2016    ALKPHOS 59 11/06/2016    AST 27 11/06/2016    ALT 9 11/06/2016    LABGLOM >60 08/02/2018    GFRAA >60 08/02/2018    GLOB NOT REPORTED 11/06/2016         Assessment/Plan:      1. Acute non-recurrent maxillary sinusitis  - amoxicillin-clavulanate (AUGMENTIN) 875-125 MG per tablet; Take 1 tablet by mouth 2 times daily for 7 days  Dispense: 14 tablet; Refill: 0    2. Allergic conjunctivitis of both eyes  - Cetirizine HCl (ZYRTEC ALLERGY) 10 MG CAPS; Take 10 mg by mouth daily  Dispense: 30 capsule; Refill: 6  - olopatadine (PATANOL) 0.1 % ophthalmic solution; Place 1 drop into both eyes 2 times daily  Dispense: 1 Bottle; Refill: 3    3. Hypercholesteremia  - atorvastatin (LIPITOR) 10 MG tablet; Take 1 tablet by mouth daily  Dispense: 30 tablet; Refill: 5  - Comprehensive Metabolic Panel; Future    4. Hypothyroidism, unspecified type  - levothyroxine (SYNTHROID) 88 MCG tablet; take 1 tablet by mouth once daily  Dispense: 30 tablet; Refill: 5  - TSH With Reflex Ft4; Future    5. Serum potassium elevated  - Comprehensive Metabolic Panel; Future    6. Tobacco abuse  - varenicline (CHANTIX CONTINUING MONTH RICH) 1 MG tablet; Take 1 tablet by mouth 2 times daily  Dispense: 60 tablet; Refill: 3    7.  Current smoker  - varenicline

## 2018-08-29 RX ORDER — KETOTIFEN FUMARATE 0.35 MG/ML
1 SOLUTION/ DROPS OPHTHALMIC 2 TIMES DAILY
Qty: 1 ML | Refills: 0 | Status: SHIPPED | OUTPATIENT
Start: 2018-08-29 | End: 2018-09-08

## 2018-08-31 RX ORDER — FLUTICASONE PROPIONATE 50 MCG
SPRAY, SUSPENSION (ML) NASAL
Qty: 16 G | Refills: 5 | Status: SHIPPED | OUTPATIENT
Start: 2018-08-31 | End: 2019-03-20 | Stop reason: SDUPTHER

## 2018-09-19 ENCOUNTER — HOSPITAL ENCOUNTER (OUTPATIENT)
Dept: WOMENS IMAGING | Age: 58
Discharge: HOME | End: 2018-09-21
Payer: MEDICARE

## 2018-09-19 DIAGNOSIS — Z12.39 BREAST CANCER SCREENING: ICD-10-CM

## 2018-09-19 PROCEDURE — 77063 BREAST TOMOSYNTHESIS BI: CPT

## 2019-01-21 DIAGNOSIS — E03.9 HYPOTHYROIDISM, UNSPECIFIED TYPE: Chronic | ICD-10-CM

## 2019-01-22 RX ORDER — LEVOTHYROXINE SODIUM 88 UG/1
TABLET ORAL
Qty: 90 TABLET | Refills: 1 | Status: SHIPPED | OUTPATIENT
Start: 2019-01-22 | End: 2019-07-06 | Stop reason: SDUPTHER

## 2019-02-02 DIAGNOSIS — S63.501D SPRAIN OF RIGHT WRIST, SUBSEQUENT ENCOUNTER: ICD-10-CM

## 2019-02-02 DIAGNOSIS — M65.4 DE QUERVAIN'S TENOSYNOVITIS, RIGHT: ICD-10-CM

## 2019-02-02 DIAGNOSIS — S86.812A STRAIN OF LEFT PATELLAR TENDON, INITIAL ENCOUNTER: ICD-10-CM

## 2019-02-02 DIAGNOSIS — R52 GENERALIZED PAIN: ICD-10-CM

## 2019-02-04 RX ORDER — NAPROXEN 500 MG/1
TABLET ORAL
Qty: 180 TABLET | Refills: 0 | Status: SHIPPED | OUTPATIENT
Start: 2019-02-04 | End: 2019-05-03 | Stop reason: SDUPTHER

## 2019-03-20 ENCOUNTER — OFFICE VISIT (OUTPATIENT)
Dept: FAMILY MEDICINE CLINIC | Age: 59
End: 2019-03-20
Payer: MEDICARE

## 2019-03-20 ENCOUNTER — HOSPITAL ENCOUNTER (OUTPATIENT)
Age: 59
Setting detail: SPECIMEN
Discharge: HOME | End: 2019-03-20
Payer: MEDICARE

## 2019-03-20 VITALS
HEIGHT: 62 IN | TEMPERATURE: 97.2 F | HEART RATE: 75 BPM | SYSTOLIC BLOOD PRESSURE: 112 MMHG | OXYGEN SATURATION: 95 % | WEIGHT: 145.4 LBS | DIASTOLIC BLOOD PRESSURE: 72 MMHG | BODY MASS INDEX: 26.76 KG/M2 | RESPIRATION RATE: 16 BRPM

## 2019-03-20 DIAGNOSIS — J01.90 ACUTE BACTERIAL SINUSITIS: ICD-10-CM

## 2019-03-20 DIAGNOSIS — E78.00 HYPERCHOLESTEREMIA: ICD-10-CM

## 2019-03-20 DIAGNOSIS — H10.13 ALLERGIC CONJUNCTIVITIS OF BOTH EYES: ICD-10-CM

## 2019-03-20 DIAGNOSIS — R07.9 CHEST PAIN, UNSPECIFIED TYPE: Primary | ICD-10-CM

## 2019-03-20 DIAGNOSIS — Z72.0 TOBACCO ABUSE: ICD-10-CM

## 2019-03-20 DIAGNOSIS — R06.09 DYSPNEA ON EXERTION: ICD-10-CM

## 2019-03-20 DIAGNOSIS — R07.9 CHEST PAIN, UNSPECIFIED TYPE: ICD-10-CM

## 2019-03-20 DIAGNOSIS — E87.5 SERUM POTASSIUM ELEVATED: ICD-10-CM

## 2019-03-20 DIAGNOSIS — E03.9 HYPOTHYROIDISM, UNSPECIFIED TYPE: Chronic | ICD-10-CM

## 2019-03-20 DIAGNOSIS — B96.89 ACUTE BACTERIAL SINUSITIS: ICD-10-CM

## 2019-03-20 LAB
ABSOLUTE EOS #: 0.08 K/UL (ref 0–0.44)
ABSOLUTE IMMATURE GRANULOCYTE: 0.03 K/UL (ref 0–0.3)
ABSOLUTE LYMPH #: 1.57 K/UL (ref 1.1–3.7)
ABSOLUTE MONO #: 0.63 K/UL (ref 0.1–1.2)
BASOPHILS # BLD: 1 % (ref 0–2)
BASOPHILS ABSOLUTE: 0.08 K/UL (ref 0–0.2)
DIFFERENTIAL TYPE: ABNORMAL
EOSINOPHILS RELATIVE PERCENT: 1 % (ref 1–4)
HCT VFR BLD CALC: 47.1 % (ref 36.3–47.1)
HEMOGLOBIN: 15.6 G/DL (ref 11.9–15.1)
IMMATURE GRANULOCYTES: 0 %
LYMPHOCYTES # BLD: 19 % (ref 24–43)
MCH RBC QN AUTO: 32.6 PG (ref 25.2–33.5)
MCHC RBC AUTO-ENTMCNC: 33.1 G/DL (ref 28.4–34.8)
MCV RBC AUTO: 98.5 FL (ref 82.6–102.9)
MONOCYTES # BLD: 8 % (ref 3–12)
NRBC AUTOMATED: 0 PER 100 WBC
PDW BLD-RTO: 13.8 % (ref 11.8–14.4)
PLATELET # BLD: 224 K/UL (ref 138–453)
PLATELET ESTIMATE: ABNORMAL
PMV BLD AUTO: 10.4 FL (ref 8.1–13.5)
RBC # BLD: 4.78 M/UL (ref 3.95–5.11)
RBC # BLD: ABNORMAL 10*6/UL
SEG NEUTROPHILS: 71 % (ref 36–65)
SEGMENTED NEUTROPHILS ABSOLUTE COUNT: 5.83 K/UL (ref 1.5–8.1)
WBC # BLD: 8.2 K/UL (ref 3.5–11.3)
WBC # BLD: ABNORMAL 10*3/UL

## 2019-03-20 PROCEDURE — G8484 FLU IMMUNIZE NO ADMIN: HCPCS | Performed by: INTERNAL MEDICINE

## 2019-03-20 PROCEDURE — 93000 ELECTROCARDIOGRAM COMPLETE: CPT | Performed by: INTERNAL MEDICINE

## 2019-03-20 PROCEDURE — G8427 DOCREV CUR MEDS BY ELIG CLIN: HCPCS | Performed by: INTERNAL MEDICINE

## 2019-03-20 PROCEDURE — 99214 OFFICE O/P EST MOD 30 MIN: CPT | Performed by: INTERNAL MEDICINE

## 2019-03-20 PROCEDURE — G8417 CALC BMI ABV UP PARAM F/U: HCPCS | Performed by: INTERNAL MEDICINE

## 2019-03-20 PROCEDURE — 3017F COLORECTAL CA SCREEN DOC REV: CPT | Performed by: INTERNAL MEDICINE

## 2019-03-20 PROCEDURE — 4004F PT TOBACCO SCREEN RCVD TLK: CPT | Performed by: INTERNAL MEDICINE

## 2019-03-20 RX ORDER — BUPROPION HYDROCHLORIDE 150 MG/1
150 TABLET, EXTENDED RELEASE ORAL 2 TIMES DAILY
Qty: 60 TABLET | Refills: 3 | Status: SHIPPED | OUTPATIENT
Start: 2019-03-20 | End: 2019-12-04 | Stop reason: CLARIF

## 2019-03-20 RX ORDER — ATORVASTATIN CALCIUM 10 MG/1
10 TABLET, FILM COATED ORAL DAILY
Qty: 30 TABLET | Refills: 5 | Status: SHIPPED | OUTPATIENT
Start: 2019-03-20 | End: 2019-10-31 | Stop reason: SDUPTHER

## 2019-03-20 RX ORDER — AMOXICILLIN 875 MG/1
875 TABLET, COATED ORAL 2 TIMES DAILY
Qty: 14 TABLET | Refills: 0 | Status: SHIPPED | OUTPATIENT
Start: 2019-03-20 | End: 2019-03-27

## 2019-03-20 RX ORDER — FLUTICASONE PROPIONATE 50 MCG
SPRAY, SUSPENSION (ML) NASAL
Qty: 16 G | Refills: 5 | Status: SHIPPED | OUTPATIENT
Start: 2019-03-20 | End: 2019-12-04 | Stop reason: SDUPTHER

## 2019-03-20 ASSESSMENT — PATIENT HEALTH QUESTIONNAIRE - PHQ9
SUM OF ALL RESPONSES TO PHQ QUESTIONS 1-9: 0
2. FEELING DOWN, DEPRESSED OR HOPELESS: 0
1. LITTLE INTEREST OR PLEASURE IN DOING THINGS: 0
SUM OF ALL RESPONSES TO PHQ QUESTIONS 1-9: 0
SUM OF ALL RESPONSES TO PHQ9 QUESTIONS 1 & 2: 0

## 2019-03-22 ENCOUNTER — HOSPITAL ENCOUNTER (OUTPATIENT)
Dept: GENERAL RADIOLOGY | Facility: CLINIC | Age: 59
Discharge: HOME | End: 2019-03-24
Payer: MEDICARE

## 2019-03-22 ENCOUNTER — HOSPITAL ENCOUNTER (OUTPATIENT)
Facility: CLINIC | Age: 59
Discharge: HOME | End: 2019-03-24
Payer: MEDICARE

## 2019-03-22 ENCOUNTER — HOSPITAL ENCOUNTER (OUTPATIENT)
Age: 59
Setting detail: SPECIMEN
Discharge: HOME | End: 2019-03-22
Payer: MEDICARE

## 2019-03-22 DIAGNOSIS — R07.9 CHEST PAIN, UNSPECIFIED TYPE: ICD-10-CM

## 2019-03-22 LAB
ALBUMIN SERPL-MCNC: 4.6 G/DL (ref 3.5–5.2)
ALBUMIN/GLOBULIN RATIO: 1.7 (ref 1–2.5)
ALP BLD-CCNC: 54 U/L (ref 35–104)
ALT SERPL-CCNC: 23 U/L (ref 5–33)
ANION GAP SERPL CALCULATED.3IONS-SCNC: 10 MMOL/L (ref 9–17)
AST SERPL-CCNC: 28 U/L
BILIRUB SERPL-MCNC: 0.57 MG/DL (ref 0.3–1.2)
BUN BLDV-MCNC: 8 MG/DL (ref 6–20)
BUN/CREAT BLD: ABNORMAL (ref 9–20)
CALCIUM SERPL-MCNC: 9.2 MG/DL (ref 8.6–10.4)
CHLORIDE BLD-SCNC: 103 MMOL/L (ref 98–107)
CO2: 26 MMOL/L (ref 20–31)
CREAT SERPL-MCNC: 0.65 MG/DL (ref 0.5–0.9)
GFR AFRICAN AMERICAN: >60 ML/MIN
GFR NON-AFRICAN AMERICAN: >60 ML/MIN
GFR SERPL CREATININE-BSD FRML MDRD: ABNORMAL ML/MIN/{1.73_M2}
GFR SERPL CREATININE-BSD FRML MDRD: ABNORMAL ML/MIN/{1.73_M2}
GLUCOSE BLD-MCNC: 104 MG/DL (ref 70–99)
POTASSIUM SERPL-SCNC: 4.8 MMOL/L (ref 3.7–5.3)
SODIUM BLD-SCNC: 139 MMOL/L (ref 135–144)
TOTAL PROTEIN: 7.3 G/DL (ref 6.4–8.3)
TSH SERPL DL<=0.05 MIU/L-ACNC: 1.8 MIU/L (ref 0.3–5)

## 2019-03-22 PROCEDURE — 71046 X-RAY EXAM CHEST 2 VIEWS: CPT

## 2019-04-05 ENCOUNTER — HOSPITAL ENCOUNTER (OUTPATIENT)
Dept: NUCLEAR MEDICINE | Age: 59
Discharge: HOME | End: 2019-04-07
Payer: MEDICARE

## 2019-04-05 ENCOUNTER — HOSPITAL ENCOUNTER (OUTPATIENT)
Dept: NON INVASIVE DIAGNOSTICS | Age: 59
Discharge: HOME | End: 2019-04-05
Payer: MEDICARE

## 2019-04-05 ENCOUNTER — TELEPHONE (OUTPATIENT)
Dept: FAMILY MEDICINE CLINIC | Age: 59
End: 2019-04-05

## 2019-04-05 DIAGNOSIS — R07.9 CHEST PAIN, UNSPECIFIED TYPE: ICD-10-CM

## 2019-04-05 DIAGNOSIS — Z72.0 TOBACCO ABUSE: ICD-10-CM

## 2019-04-05 DIAGNOSIS — R06.09 DYSPNEA ON EXERTION: ICD-10-CM

## 2019-04-05 DIAGNOSIS — R94.39 ABNORMAL CARDIOVASCULAR STRESS TEST: Primary | ICD-10-CM

## 2019-04-05 LAB
LV EF: 67 %
LVEF MODALITY: NORMAL

## 2019-04-05 PROCEDURE — A9500 TC99M SESTAMIBI: HCPCS | Performed by: INTERNAL MEDICINE

## 2019-04-05 PROCEDURE — 78452 HT MUSCLE IMAGE SPECT MULT: CPT

## 2019-04-05 PROCEDURE — 2580000003 HC RX 258: Performed by: INTERNAL MEDICINE

## 2019-04-05 PROCEDURE — 3430000000 HC RX DIAGNOSTIC RADIOPHARMACEUTICAL: Performed by: INTERNAL MEDICINE

## 2019-04-05 PROCEDURE — 93017 CV STRESS TEST TRACING ONLY: CPT | Performed by: NURSE PRACTITIONER

## 2019-04-05 RX ORDER — METOPROLOL TARTRATE 5 MG/5ML
2.5 INJECTION INTRAVENOUS PRN
Status: DISCONTINUED | OUTPATIENT
Start: 2019-04-05 | End: 2019-04-05 | Stop reason: HOSPADM

## 2019-04-05 RX ORDER — SODIUM CHLORIDE 9 MG/ML
INJECTION, SOLUTION INTRAVENOUS ONCE
Status: COMPLETED | OUTPATIENT
Start: 2019-04-05 | End: 2019-04-05

## 2019-04-05 RX ORDER — SODIUM CHLORIDE 0.9 % (FLUSH) 0.9 %
10 SYRINGE (ML) INJECTION PRN
Status: DISCONTINUED | OUTPATIENT
Start: 2019-04-05 | End: 2019-04-08 | Stop reason: HOSPADM

## 2019-04-05 RX ORDER — NITROGLYCERIN 0.4 MG/1
0.4 TABLET SUBLINGUAL EVERY 5 MIN PRN
Status: DISCONTINUED | OUTPATIENT
Start: 2019-04-05 | End: 2019-04-05 | Stop reason: HOSPADM

## 2019-04-05 RX ORDER — SODIUM CHLORIDE 0.9 % (FLUSH) 0.9 %
10 SYRINGE (ML) INJECTION PRN
Status: DISCONTINUED | OUTPATIENT
Start: 2019-04-05 | End: 2019-04-05 | Stop reason: HOSPADM

## 2019-04-05 RX ADMIN — TETRAKIS(2-METHOXYISOBUTYLISOCYANIDE)COPPER(I) TETRAFLUOROBORATE 42 MILLICURIE: 1 INJECTION, POWDER, LYOPHILIZED, FOR SOLUTION INTRAVENOUS at 10:11

## 2019-04-05 RX ADMIN — Medication 10 ML: at 09:55

## 2019-04-05 RX ADMIN — TETRAKIS(2-METHOXYISOBUTYLISOCYANIDE)COPPER(I) TETRAFLUOROBORATE 15 MILLICURIE: 1 INJECTION, POWDER, LYOPHILIZED, FOR SOLUTION INTRAVENOUS at 08:20

## 2019-04-05 RX ADMIN — SODIUM CHLORIDE, PRESERVATIVE FREE 10 ML: 5 INJECTION INTRAVENOUS at 08:20

## 2019-04-05 RX ADMIN — SODIUM CHLORIDE, PRESERVATIVE FREE 10 ML: 5 INJECTION INTRAVENOUS at 10:11

## 2019-04-05 RX ADMIN — Medication 10 ML: at 09:56

## 2019-04-05 RX ADMIN — SODIUM CHLORIDE: 9 INJECTION, SOLUTION INTRAVENOUS at 10:05

## 2019-04-05 NOTE — TELEPHONE ENCOUNTER
Needs cardiology eval and cardiac cath   Referral placed  Please see if they can get her in ASAP for a visit

## 2019-04-11 ENCOUNTER — OFFICE VISIT (OUTPATIENT)
Dept: FAMILY MEDICINE CLINIC | Age: 59
End: 2019-04-11
Payer: MEDICARE

## 2019-04-11 VITALS
RESPIRATION RATE: 16 BRPM | DIASTOLIC BLOOD PRESSURE: 84 MMHG | TEMPERATURE: 97.5 F | HEART RATE: 69 BPM | WEIGHT: 145 LBS | OXYGEN SATURATION: 99 % | SYSTOLIC BLOOD PRESSURE: 124 MMHG | BODY MASS INDEX: 26.51 KG/M2

## 2019-04-11 DIAGNOSIS — R94.39 ABNORMAL STRESS TEST: Primary | ICD-10-CM

## 2019-04-11 PROCEDURE — 3017F COLORECTAL CA SCREEN DOC REV: CPT | Performed by: INTERNAL MEDICINE

## 2019-04-11 PROCEDURE — G8417 CALC BMI ABV UP PARAM F/U: HCPCS | Performed by: INTERNAL MEDICINE

## 2019-04-11 PROCEDURE — 4004F PT TOBACCO SCREEN RCVD TLK: CPT | Performed by: INTERNAL MEDICINE

## 2019-04-11 PROCEDURE — 99213 OFFICE O/P EST LOW 20 MIN: CPT | Performed by: INTERNAL MEDICINE

## 2019-04-11 PROCEDURE — G8428 CUR MEDS NOT DOCUMENT: HCPCS | Performed by: INTERNAL MEDICINE

## 2019-04-11 RX ORDER — NITROGLYCERIN 0.4 MG/1
1 TABLET SUBLINGUAL PRN
COMMUNITY
Start: 2019-04-10 | End: 2019-12-04 | Stop reason: SDUPTHER

## 2019-04-11 NOTE — PROGRESS NOTES
Patient is present for a follow up appt. She states she will be having a heart cath done May 2. She did see a cardiologist 4/10. She also had a stress test done. No question or concerns at this time. Visit Information    Have you changed or started any medications since your last visit including any over-the-counter medicines, vitamins, or herbal medicines? no   Have you stopped taking any of your medications? Is so, why? -  no  Are you having any side effects from any of your medications? - no    Have you seen any other physician or provider since your last visit? yes - cardio    Have you had any other diagnostic tests since your last visit? yes - stress test, ekg    Have you been seen in the emergency room and/or had an admission in a hospital since we last saw you?  no   Have you had your routine dental cleaning in the past 6 months?  no     Do you have an active MyChart account? If no, what is the barrier?   No: declined     Patient Care Team:  Prashanth Guzman MD as PCP - General (Internal Medicine)  BETHANY Figueroa CNP as PCP - S Attributed Provider    Medical History Review  Past Medical, Family, and Social History reviewed and does not contribute to the patient presenting condition    Health Maintenance   Topic Date Due    Colon cancer screen colonoscopy  10/23/2012    Diabetes screen  12/10/2015    Shingles Vaccine (1 of 2) 08/21/2019 (Originally 10/23/2012)    DTaP/Tdap/Td vaccine (1 - Tdap) 03/20/2020 (Originally 10/23/1981)    Flu vaccine (Season Ended) 03/20/2020 (Originally 9/1/2019)    Pneumococcal 0-64 years Vaccine (1 of 1 - PPSV23) 03/20/2020 (Originally 10/23/1966)    TSH testing  03/22/2020    Breast cancer screen  09/19/2020    Cervical cancer screen  07/13/2022    Lipid screen  08/02/2023    Hepatitis C screen  Completed    HIV screen  Completed

## 2019-04-11 NOTE — PROGRESS NOTES
Subjective:       Patient ID: Jeanette Liang is a 66 y.o. female who presents for   Chief Complaint   Patient presents with    Results     stress test        HPI:  Nursing note reviewed and discussed with patient. Here to review stress test results, has lots of questions   Her stress test was positive for ECG changes in the inferolateral leads with ST depression 1.3-1.5mm, no wall motion abnormalities noted on the nuclear portion. She saw cardiology yesterday and will have cardiac cath done, scheduled on . She has been started on aspirin and PRN sublingual nitro. She has been cutting back on the smoking slowly, started wellbutrin but makes her sleepy. Taking wellbutrin in the morning but has not tried just taking it at night. She has continued to have these episodes of chest pain radiating to the left side during exertion and with activity. She has questions about what exactly is wrong with her heart, why she needs a cath and why this is happening to her. She wants to know what she needs to do in the future to make it so this does not occur again. She has some anxiety about the cath as well. Patient's medications, allergies, past medical, surgical, social and family histories were reviewed and updated as appropriate. Social History     Tobacco Use    Smoking status: Current Every Day Smoker     Packs/day: 0.50     Years: 34.00     Pack years: 17.00     Types: Cigarettes     Last attempt to quit: 2011     Years since quittin.8    Smokeless tobacco: Never Used   Substance Use Topics    Alcohol use: No     Alcohol/week: 0.0 oz        Review of Systems  Energy level good overall, and weight is stable. No chest pain or shortness of breath.     Bowels have been normal without constipation or diarrhea         Objective:        Physical Exam:  /84 (Site: Left Upper Arm, Position: Sitting, Cuff Size: Medium Adult)   Pulse 69   Temp 97.5 °F (36.4 °C) (Oral)   Resp 16   Wt 145 lb (65.8 kg)   SpO2 99%   BMI 26.51 kg/m²     General: Alert and oriented, in no distress. Patient ambulating with normal gait. Normal body habitus. Chest: clear with no wheezes or rales. No retractions, or use of accessory muscles noted. Cardiovascular: PMI is not displaced, and no thrill noted. Regular rate and rhythm with no rub, murmur or gallop. There is no peripheral edema. Pedal pulses are normal.   Abdomen: Abdomen is soft and nontender. The bowel sounds are normal.   Musculoskeletal: There are no deformities of the the extremities. Patient has all ten fingers intact. The patient has full range of motion on all 4 extremities without pain. Skin: The skin is warm and dry. There are no rashes noted. Prior to Visit Medications    Medication Sig Taking? Authorizing Provider   nitroGLYCERIN (NITROSTAT) 0.4 MG SL tablet Place 1 tablet under the tongue as needed  Historical Provider, MD   fluticasone (FLONASE) 50 MCG/ACT nasal spray instill 1 spray into each nostril once daily  Nicole Salvador MD   atorvastatin (LIPITOR) 10 MG tablet Take 1 tablet by mouth daily  Nicole Salvador MD   Cetirizine HCl (ZYRTEC ALLERGY) 10 MG CAPS Take 10 mg by mouth daily  Nicole Salvador MD   buPROPion [facility] SR) 150 MG extended release tablet Take 1 tablet by mouth 2 times daily Start with one pill daily for a week then increase to twice daily  Nicole Salvador MD   naproxen (NAPROSYN) 500 MG tablet take 1 tablet by mouth twice a day with food  BETHANY Ch - CNP   levothyroxine (SYNTHROID) 88 MCG tablet take 1 tablet by mouth once daily  Nicole Salvador MD       Data Review Nuclear stress test results and cardiology visit note reviewed      Assessment/Plan:      1.  Abnormal stress test  Cardiac cath scheduled on 5/2 -will follow  Smoking cessation strongly encouraged - take the wellbutrin at night if that will make it easier   Will need to increase lipitor dose, pt wants to wait till after cath   Heart circulation reviewed with patientPoint cardiac anatomy pictures, pt is more comfortable now with understanding her most probably diagnosis of coronary artery disease   Will call with questions           Electronically signed by Rayne Hopkins MD on 4/11/2019 at 12:04 PM

## 2019-05-02 ENCOUNTER — HOSPITAL ENCOUNTER (OUTPATIENT)
Dept: CARDIAC CATH/INVASIVE PROCEDURES | Age: 59
Discharge: HOME | End: 2019-05-02
Payer: MEDICARE

## 2019-05-02 VITALS
WEIGHT: 140 LBS | SYSTOLIC BLOOD PRESSURE: 116 MMHG | HEIGHT: 62 IN | BODY MASS INDEX: 25.76 KG/M2 | RESPIRATION RATE: 12 BRPM | OXYGEN SATURATION: 97 % | TEMPERATURE: 97.6 F | HEART RATE: 75 BPM | DIASTOLIC BLOOD PRESSURE: 78 MMHG

## 2019-05-02 LAB
GFR NON-AFRICAN AMERICAN: >60 ML/MIN
GFR SERPL CREATININE-BSD FRML MDRD: >60 ML/MIN
GFR SERPL CREATININE-BSD FRML MDRD: NORMAL ML/MIN/{1.73_M2}
GLUCOSE BLD-MCNC: 90 MG/DL (ref 74–100)
LV EF: 60 %
LVEF MODALITY: NORMAL
PLATELET # BLD: 212 K/UL (ref 138–453)
POC CHLORIDE: 107 MMOL/L (ref 98–107)
POC CREATININE: 0.84 MG/DL (ref 0.51–1.19)
POC HEMATOCRIT: 51 % (ref 36–46)
POC HEMOGLOBIN: 17.5 G/DL (ref 12–16)
POC POTASSIUM: 4.3 MMOL/L (ref 3.5–4.5)
POC SODIUM: 144 MMOL/L (ref 138–146)

## 2019-05-02 PROCEDURE — C1769 GUIDE WIRE: HCPCS

## 2019-05-02 PROCEDURE — 82565 ASSAY OF CREATININE: CPT

## 2019-05-02 PROCEDURE — 6360000004 HC RX CONTRAST MEDICATION

## 2019-05-02 PROCEDURE — 2709999900 HC NON-CHARGEABLE SUPPLY

## 2019-05-02 PROCEDURE — 82435 ASSAY OF BLOOD CHLORIDE: CPT

## 2019-05-02 PROCEDURE — 84295 ASSAY OF SERUM SODIUM: CPT

## 2019-05-02 PROCEDURE — 82947 ASSAY GLUCOSE BLOOD QUANT: CPT

## 2019-05-02 PROCEDURE — 93458 L HRT ARTERY/VENTRICLE ANGIO: CPT | Performed by: INTERNAL MEDICINE

## 2019-05-02 PROCEDURE — 7100000010 HC PHASE II RECOVERY - FIRST 15 MIN

## 2019-05-02 PROCEDURE — 85049 AUTOMATED PLATELET COUNT: CPT

## 2019-05-02 PROCEDURE — 2500000003 HC RX 250 WO HCPCS

## 2019-05-02 PROCEDURE — 6360000002 HC RX W HCPCS

## 2019-05-02 PROCEDURE — 85014 HEMATOCRIT: CPT

## 2019-05-02 PROCEDURE — C1725 CATH, TRANSLUMIN NON-LASER: HCPCS

## 2019-05-02 PROCEDURE — 84132 ASSAY OF SERUM POTASSIUM: CPT

## 2019-05-02 PROCEDURE — 7100000011 HC PHASE II RECOVERY - ADDTL 15 MIN

## 2019-05-02 PROCEDURE — C1894 INTRO/SHEATH, NON-LASER: HCPCS

## 2019-05-02 RX ORDER — ASPIRIN 81 MG/1
81 TABLET ORAL DAILY
Qty: 30 TABLET | Refills: 5 | Status: SHIPPED | OUTPATIENT
Start: 2019-05-02 | End: 2019-12-04 | Stop reason: SDUPTHER

## 2019-05-02 RX ORDER — ONDANSETRON 2 MG/ML
4 INJECTION INTRAMUSCULAR; INTRAVENOUS EVERY 6 HOURS PRN
Status: CANCELLED | OUTPATIENT
Start: 2019-05-02

## 2019-05-02 RX ORDER — SODIUM CHLORIDE 0.9 % (FLUSH) 0.9 %
10 SYRINGE (ML) INJECTION EVERY 12 HOURS SCHEDULED
Status: CANCELLED | OUTPATIENT
Start: 2019-05-02

## 2019-05-02 RX ORDER — ISOSORBIDE MONONITRATE 30 MG/1
30 TABLET, EXTENDED RELEASE ORAL DAILY
Qty: 30 TABLET | Refills: 3 | Status: SHIPPED | OUTPATIENT
Start: 2019-05-02 | End: 2019-12-04 | Stop reason: CLARIF

## 2019-05-02 RX ORDER — ACETAMINOPHEN 325 MG/1
650 TABLET ORAL EVERY 4 HOURS PRN
Status: CANCELLED | OUTPATIENT
Start: 2019-05-02

## 2019-05-02 RX ORDER — SODIUM CHLORIDE 9 MG/ML
INJECTION, SOLUTION INTRAVENOUS CONTINUOUS
Status: DISCONTINUED | OUTPATIENT
Start: 2019-05-02 | End: 2019-05-03 | Stop reason: HOSPADM

## 2019-05-02 RX ORDER — SODIUM CHLORIDE 0.9 % (FLUSH) 0.9 %
10 SYRINGE (ML) INJECTION PRN
Status: CANCELLED | OUTPATIENT
Start: 2019-05-02

## 2019-05-02 RX ADMIN — SODIUM CHLORIDE: 9 INJECTION, SOLUTION INTRAVENOUS at 11:10

## 2019-05-02 NOTE — PROGRESS NOTES
Patient returned to room. Post cath recovery initiated. Side rails up times 2, call light with in reach. Patient denies any complaints. Right wrist with TR band intact. No hematoma or drainage noted.  Hand and fingers are warm, pink good capillary refill

## 2019-05-02 NOTE — H&P
[facility]  Procedure History and Physical Update          Patient Name: Luan Barrera  MRN:    [id number]  YOB: 1960  Date of evaluation:  5/2/2019    Procedure:    Cardiac cath +/- PCI    Indication for procedure:  Abnormal stress test      Please refer to the office note completed by Dr. Lane Allen on 4/10/2019 in the medical record and note that:    [] I have examined the patient and reviewed the H&P/Consult and there are no changes to be made to the assessment or plan. Past Medical History:   Diagnosis Date    Hyperlipidemia     Hypothyroidism        Past Surgical History:   Procedure Laterality Date    CERVICAL SPINE SURGERY      HYSTERECTOMY      partial       Family History   Problem Relation Age of Onset    Heart Disease Mother     Cancer Mother         stomach breast    Kidney Disease Sister     High Blood Pressure Sister     High Blood Pressure Brother        No Known Allergies    Prior to Admission medications    Medication Sig Start Date End Date Taking?  Authorizing Provider   fluticasone (FLONASE) 50 MCG/ACT nasal spray instill 1 spray into each nostril once daily 3/20/19  Yes Nicole Richards MD   atorvastatin (LIPITOR) 10 MG tablet Take 1 tablet by mouth daily 3/20/19  Yes Nicole Richards MD   Cetirizine HCl (ZYRTEC ALLERGY) 10 MG CAPS Take 10 mg by mouth daily 3/20/19  Yes Nicole Richards MD   naproxen (NAPROSYN) 500 MG tablet take 1 tablet by mouth twice a day with food 2/4/19  Yes Tinnie Spray, APRN - CNP   levothyroxine (SYNTHROID) 88 MCG tablet take 1 tablet by mouth once daily 1/22/19  Yes Nicole Richards MD   nitroGLYCERIN (NITROSTAT) 0.4 MG SL tablet Place 1 tablet under the tongue as needed 4/10/19   Historical Provider, MD   buPROPion [facility] SR) 150 MG extended release tablet Take 1 tablet by mouth 2 times daily Start with one pill daily for a week then increase to twice daily 3/20/19   Yaakov Palacios MD         Vitals:

## 2019-05-02 NOTE — PROGRESS NOTES
Patient admitted, consent signed and questions answered. Patient ready for procedure. Call light to reach with side rails up 2 of 2. Bilateral groin clipped. Spouse at bedside with patient. History and physical needs completed.

## 2019-05-02 NOTE — OP NOTE
microvascular disease  4. Aggressive risk factor modification, including smoking cessation  5. Discussed with Dr Megan Stone     ____________________________________________________________________    History and Risk Factors    [x] Hypertension     [x] Family history of CAD  [x] Hyperlipidemia     [] Cerebrovascular Disease   [] Prior MI       [] Peripheral Vascular disease   [] Prior PCI              [] Diabetes Mellitus    [] Left Main PCI. [] Currently on Dialysis. [] Prior CABG. [x] Currently smoker. [] Cardiac Arrest outside of healthcare facility. [] Yes    [x] No     [] Cardiac Arrest at other Facility. [] Yes   [x] No    Pre-Procedure Information.   Heart Failure       [] Yes    [x] No    Diagnostic Test:   EKG       [x] Normal   [] Abnormal    New antiarrhythmia medications:    [] Yes   [x] No   New onset atrial fibrillation / Flutter     [] Yes   [x] No    Stress Test Performed:      [x] Yes    [] No     Type:     [] Stress Echo   [] Exercise Stress Test (no imaging)      [x] Stress Nuclear  [] Stress Imaging     Results   [] Negative   [x] Positive        [] Indeterminate  [] Unavailable     If Positive/ Risk / Extent of Ischemia:       [] Low  [x] Intermediate         [] High  [] Unavailable      Cardiac CTA Performed:     [] Yes    [x] No     Pre Procedure Medications:   [] Yes    [x] No         Electronically signed on 5/2/2019 at 11:58 AM by:    Carmen Farris MD  Fellow, Cardiovascular Diseases  [address] [facility]      Attending Physician       Tatianna Lennon [facility]  [address] [facility]  [phone]

## 2019-05-03 DIAGNOSIS — S63.501D SPRAIN OF RIGHT WRIST, SUBSEQUENT ENCOUNTER: ICD-10-CM

## 2019-05-03 DIAGNOSIS — R52 GENERALIZED PAIN: ICD-10-CM

## 2019-05-03 DIAGNOSIS — M65.4 DE QUERVAIN'S TENOSYNOVITIS, RIGHT: ICD-10-CM

## 2019-05-03 DIAGNOSIS — S86.812A STRAIN OF LEFT PATELLAR TENDON, INITIAL ENCOUNTER: ICD-10-CM

## 2019-05-03 RX ORDER — NAPROXEN 500 MG/1
TABLET ORAL
Qty: 180 TABLET | Refills: 1 | Status: SHIPPED | OUTPATIENT
Start: 2019-05-03 | End: 2019-10-31 | Stop reason: SDUPTHER

## 2019-05-03 NOTE — TELEPHONE ENCOUNTER
Last visit: 4/11/19  Last Med refill: 2/4/19  Does patient have enough medication for 72 hours: No:     Next Visit Date:  Future Appointments   Date Time Provider Coretta Pam   6/11/2019 11:30 AM Nicole Hsu Degree, MD [id number]   9/18/2019 11:30 AM Nicole Hsu Degree,  [address] Maintenance   Topic Date Due    Colon cancer screen colonoscopy  10/23/2012    Diabetes screen  12/10/2015    Shingles Vaccine (1 of 2) 08/21/2019 (Originally 10/23/2012)    DTaP/Tdap/Td vaccine (1 - Tdap) 03/20/2020 (Originally 10/23/1981)    Flu vaccine (Season Ended) 03/20/2020 (Originally 9/1/2019)    Pneumococcal 0-64 years Vaccine (1 of 1 - PPSV23) 03/20/2020 (Originally 10/23/1968)    TSH testing  03/22/2020    Breast cancer screen  09/19/2020    Cervical cancer screen  07/13/2022    Lipid screen  08/02/2023    Hepatitis C screen  Completed    HIV screen  Completed       Hemoglobin A1C (%)   Date Value   12/10/2012 5.6             ( goal A1C is < 7)   No results found for: LABMICR  LDL Cholesterol (mg/dL)   Date Value   08/02/2018 136 (H)   06/15/2017 187 (H)       (goal LDL is <100)   AST (U/L)   Date Value   03/22/2019 28     ALT (U/L)   Date Value   03/22/2019 23     BUN (mg/dL)   Date Value   03/22/2019 8     BP Readings from Last 3 Encounters:   05/02/19 116/78   04/11/19 124/84   03/20/19 112/72          (goal 120/80)    All Future Testing planned in [facility]  Lab Frequency Next Occurrence               Patient Active Problem List:     Hypothyroidism     Hypercholesteremia     Subdural hematoma ([facility] [address]     Fall as cause of accidental injury at home as place of occurrence     Midline shift of brain     Head trauma

## 2019-07-06 DIAGNOSIS — E03.9 HYPOTHYROIDISM, UNSPECIFIED TYPE: Chronic | ICD-10-CM

## 2019-07-08 RX ORDER — LEVOTHYROXINE SODIUM 88 UG/1
TABLET ORAL
Qty: 90 TABLET | Refills: 1 | Status: SHIPPED | OUTPATIENT
Start: 2019-07-08 | End: 2019-12-04 | Stop reason: SDUPTHER

## 2019-10-01 DIAGNOSIS — H10.13 ALLERGIC CONJUNCTIVITIS OF BOTH EYES: ICD-10-CM

## 2019-10-31 DIAGNOSIS — M65.4 DE QUERVAIN'S TENOSYNOVITIS, RIGHT: ICD-10-CM

## 2019-10-31 DIAGNOSIS — S63.501D SPRAIN OF RIGHT WRIST, SUBSEQUENT ENCOUNTER: ICD-10-CM

## 2019-10-31 DIAGNOSIS — H10.13 ALLERGIC CONJUNCTIVITIS OF BOTH EYES: ICD-10-CM

## 2019-10-31 DIAGNOSIS — S86.812A STRAIN OF LEFT PATELLAR TENDON, INITIAL ENCOUNTER: ICD-10-CM

## 2019-10-31 DIAGNOSIS — R52 GENERALIZED PAIN: ICD-10-CM

## 2019-10-31 DIAGNOSIS — E78.00 HYPERCHOLESTEREMIA: ICD-10-CM

## 2019-11-01 RX ORDER — ATORVASTATIN CALCIUM 10 MG/1
TABLET, FILM COATED ORAL
Qty: 30 TABLET | Refills: 0 | Status: SHIPPED | OUTPATIENT
Start: 2019-11-01 | End: 2019-12-04 | Stop reason: SDUPTHER

## 2019-11-01 RX ORDER — NAPROXEN 500 MG/1
TABLET ORAL
Qty: 60 TABLET | Refills: 0 | Status: SHIPPED | OUTPATIENT
Start: 2019-11-01 | End: 2019-11-29 | Stop reason: SDUPTHER

## 2019-11-08 ENCOUNTER — HOSPITAL ENCOUNTER (OUTPATIENT)
Dept: WOMENS IMAGING | Age: 59
Discharge: HOME | End: 2019-11-10
Payer: MEDICARE

## 2019-11-08 DIAGNOSIS — Z12.39 SCREENING BREAST EXAMINATION: ICD-10-CM

## 2019-11-08 PROCEDURE — 77063 BREAST TOMOSYNTHESIS BI: CPT

## 2019-11-27 DIAGNOSIS — H10.13 ALLERGIC CONJUNCTIVITIS OF BOTH EYES: ICD-10-CM

## 2019-11-29 DIAGNOSIS — S63.501D SPRAIN OF RIGHT WRIST, SUBSEQUENT ENCOUNTER: ICD-10-CM

## 2019-11-29 DIAGNOSIS — M65.4 DE QUERVAIN'S TENOSYNOVITIS, RIGHT: ICD-10-CM

## 2019-11-29 DIAGNOSIS — S86.812A STRAIN OF LEFT PATELLAR TENDON, INITIAL ENCOUNTER: ICD-10-CM

## 2019-11-29 DIAGNOSIS — R52 GENERALIZED PAIN: ICD-10-CM

## 2019-11-29 RX ORDER — NAPROXEN 500 MG/1
TABLET ORAL
Qty: 60 TABLET | Refills: 5 | Status: SHIPPED | OUTPATIENT
Start: 2019-11-29 | End: 2019-12-04 | Stop reason: SDUPTHER

## 2019-12-04 ENCOUNTER — OFFICE VISIT (OUTPATIENT)
Dept: FAMILY MEDICINE CLINIC | Age: 59
End: 2019-12-04
Payer: MEDICARE

## 2019-12-04 ENCOUNTER — HOSPITAL ENCOUNTER (OUTPATIENT)
Age: 59
Setting detail: SPECIMEN
Discharge: HOME | End: 2019-12-04
Payer: MEDICARE

## 2019-12-04 VITALS
BODY MASS INDEX: 27.6 KG/M2 | OXYGEN SATURATION: 93 % | HEART RATE: 73 BPM | HEIGHT: 62 IN | DIASTOLIC BLOOD PRESSURE: 83 MMHG | SYSTOLIC BLOOD PRESSURE: 120 MMHG | WEIGHT: 150 LBS

## 2019-12-04 DIAGNOSIS — Z13.1 ENCOUNTER FOR SCREENING FOR DIABETES MELLITUS: ICD-10-CM

## 2019-12-04 DIAGNOSIS — E03.9 HYPOTHYROIDISM, UNSPECIFIED TYPE: Chronic | ICD-10-CM

## 2019-12-04 DIAGNOSIS — K29.00 OTHER ACUTE GASTRITIS WITHOUT HEMORRHAGE: ICD-10-CM

## 2019-12-04 DIAGNOSIS — R07.89 OTHER CHEST PAIN: ICD-10-CM

## 2019-12-04 DIAGNOSIS — Z12.11 SCREENING FOR COLON CANCER: ICD-10-CM

## 2019-12-04 DIAGNOSIS — H10.13 ALLERGIC CONJUNCTIVITIS OF BOTH EYES: ICD-10-CM

## 2019-12-04 DIAGNOSIS — E78.00 HYPERCHOLESTEREMIA: Primary | ICD-10-CM

## 2019-12-04 DIAGNOSIS — E78.00 HYPERCHOLESTEREMIA: ICD-10-CM

## 2019-12-04 DIAGNOSIS — Z72.0 TOBACCO ABUSE: ICD-10-CM

## 2019-12-04 DIAGNOSIS — R52 GENERALIZED PAIN: ICD-10-CM

## 2019-12-04 PROBLEM — K29.70 GASTRITIS: Status: ACTIVE | Noted: 2019-12-04

## 2019-12-04 LAB
ALBUMIN SERPL-MCNC: 4.4 G/DL (ref 3.5–5.2)
ALBUMIN/GLOBULIN RATIO: 1.4 (ref 1–2.5)
ALP BLD-CCNC: 67 U/L (ref 35–104)
ALT SERPL-CCNC: 18 U/L (ref 5–33)
ANION GAP SERPL CALCULATED.3IONS-SCNC: 14 MMOL/L (ref 9–17)
AST SERPL-CCNC: 25 U/L
BILIRUB SERPL-MCNC: 0.76 MG/DL (ref 0.3–1.2)
BUN BLDV-MCNC: 13 MG/DL (ref 6–20)
BUN/CREAT BLD: ABNORMAL (ref 9–20)
CALCIUM SERPL-MCNC: 9.3 MG/DL (ref 8.6–10.4)
CHLORIDE BLD-SCNC: 107 MMOL/L (ref 98–107)
CHOLESTEROL, FASTING: 235 MG/DL
CHOLESTEROL/HDL RATIO: 3.5
CO2: 24 MMOL/L (ref 20–31)
CREAT SERPL-MCNC: 0.63 MG/DL (ref 0.5–0.9)
ESTIMATED AVERAGE GLUCOSE: 114 MG/DL
GFR AFRICAN AMERICAN: >60 ML/MIN
GFR NON-AFRICAN AMERICAN: >60 ML/MIN
GFR SERPL CREATININE-BSD FRML MDRD: ABNORMAL ML/MIN/{1.73_M2}
GFR SERPL CREATININE-BSD FRML MDRD: ABNORMAL ML/MIN/{1.73_M2}
GLUCOSE BLD-MCNC: 96 MG/DL (ref 70–99)
HBA1C MFR BLD: 5.6 % (ref 4–6)
HDLC SERPL-MCNC: 67 MG/DL
LDL CHOLESTEROL: 134 MG/DL (ref 0–130)
POTASSIUM SERPL-SCNC: 5 MMOL/L (ref 3.7–5.3)
SODIUM BLD-SCNC: 145 MMOL/L (ref 135–144)
TOTAL PROTEIN: 7.6 G/DL (ref 6.4–8.3)
TRIGLYCERIDE, FASTING: 169 MG/DL
TSH SERPL DL<=0.05 MIU/L-ACNC: 2.43 MIU/L (ref 0.3–5)
VLDLC SERPL CALC-MCNC: ABNORMAL MG/DL (ref 1–30)

## 2019-12-04 PROCEDURE — 99214 OFFICE O/P EST MOD 30 MIN: CPT | Performed by: INTERNAL MEDICINE

## 2019-12-04 PROCEDURE — 3017F COLORECTAL CA SCREEN DOC REV: CPT | Performed by: INTERNAL MEDICINE

## 2019-12-04 PROCEDURE — G8417 CALC BMI ABV UP PARAM F/U: HCPCS | Performed by: INTERNAL MEDICINE

## 2019-12-04 PROCEDURE — G8427 DOCREV CUR MEDS BY ELIG CLIN: HCPCS | Performed by: INTERNAL MEDICINE

## 2019-12-04 PROCEDURE — 4004F PT TOBACCO SCREEN RCVD TLK: CPT | Performed by: INTERNAL MEDICINE

## 2019-12-04 PROCEDURE — G8484 FLU IMMUNIZE NO ADMIN: HCPCS | Performed by: INTERNAL MEDICINE

## 2019-12-04 RX ORDER — FLUTICASONE PROPIONATE 50 MCG
SPRAY, SUSPENSION (ML) NASAL
Qty: 16 G | Refills: 5 | Status: SHIPPED | OUTPATIENT
Start: 2019-12-04 | End: 2020-09-29 | Stop reason: SDUPTHER

## 2019-12-04 RX ORDER — FAMOTIDINE 20 MG/1
20 TABLET, FILM COATED ORAL 2 TIMES DAILY
Qty: 60 TABLET | Refills: 3 | Status: SHIPPED | OUTPATIENT
Start: 2019-12-04 | End: 2020-04-13 | Stop reason: SDUPTHER

## 2019-12-04 RX ORDER — AMLODIPINE BESYLATE 2.5 MG/1
2.5 TABLET ORAL DAILY
Qty: 90 TABLET | Refills: 1 | Status: SHIPPED | OUTPATIENT
Start: 2019-12-04 | End: 2020-09-29 | Stop reason: SDUPTHER

## 2019-12-04 RX ORDER — CETIRIZINE HYDROCHLORIDE 10 MG/1
10 TABLET ORAL DAILY
Qty: 90 TABLET | Refills: 1 | Status: SHIPPED | OUTPATIENT
Start: 2019-12-04 | End: 2020-07-13

## 2019-12-04 RX ORDER — LEVOTHYROXINE SODIUM 88 UG/1
88 TABLET ORAL DAILY
Qty: 90 TABLET | Refills: 1 | Status: SHIPPED | OUTPATIENT
Start: 2019-12-04 | End: 2020-07-15

## 2019-12-04 RX ORDER — ATORVASTATIN CALCIUM 10 MG/1
10 TABLET, FILM COATED ORAL DAILY
Qty: 90 TABLET | Refills: 1 | Status: SHIPPED | OUTPATIENT
Start: 2019-12-04 | End: 2020-05-15

## 2019-12-04 RX ORDER — NAPROXEN 500 MG/1
TABLET ORAL
Qty: 60 TABLET | Refills: 5 | Status: CANCELLED | OUTPATIENT
Start: 2019-12-04

## 2019-12-04 RX ORDER — NAPROXEN 500 MG/1
500 TABLET ORAL 2 TIMES DAILY WITH MEALS
Qty: 180 TABLET | Refills: 1 | Status: SHIPPED | OUTPATIENT
Start: 2019-12-04 | End: 2020-09-23

## 2019-12-04 RX ORDER — ASPIRIN 81 MG/1
81 TABLET ORAL DAILY
Qty: 90 TABLET | Refills: 1 | Status: SHIPPED | OUTPATIENT
Start: 2019-12-04 | End: 2020-09-23

## 2019-12-04 RX ORDER — AMLODIPINE BESYLATE 2.5 MG/1
TABLET ORAL
Refills: 0 | COMMUNITY
Start: 2019-11-24 | End: 2019-12-04 | Stop reason: SDUPTHER

## 2019-12-04 RX ORDER — NITROGLYCERIN 0.4 MG/1
0.4 TABLET SUBLINGUAL PRN
Qty: 25 TABLET | Refills: 3 | Status: SHIPPED | OUTPATIENT
Start: 2019-12-04 | End: 2022-02-23

## 2020-02-28 ENCOUNTER — TELEPHONE (OUTPATIENT)
Dept: FAMILY MEDICINE CLINIC | Age: 60
End: 2020-02-28

## 2020-03-02 RX ORDER — RANITIDINE 150 MG/1
150 TABLET ORAL 2 TIMES DAILY
Qty: 180 TABLET | Refills: 1 | Status: SHIPPED | OUTPATIENT
Start: 2020-03-02 | End: 2020-04-13 | Stop reason: SDUPTHER

## 2020-03-02 NOTE — TELEPHONE ENCOUNTER
Informed patient she stated she is fine with either medication. She stated whichever you feel is best for her.

## 2020-03-02 NOTE — TELEPHONE ENCOUNTER
Would she like to go back on the ranitidine? It is back in most pharmacies now, otherwise her option would be prilosec.

## 2020-04-13 ENCOUNTER — TELEPHONE (OUTPATIENT)
Dept: FAMILY MEDICINE CLINIC | Age: 60
End: 2020-04-13

## 2020-04-13 RX ORDER — FAMOTIDINE 20 MG/1
20 TABLET, FILM COATED ORAL 2 TIMES DAILY
Qty: 180 TABLET | Refills: 1 | Status: SHIPPED | OUTPATIENT
Start: 2020-04-13 | End: 2020-09-29 | Stop reason: SDUPTHER

## 2020-04-13 NOTE — TELEPHONE ENCOUNTER
Received fax from pharmacy. Ranitidine is on backorder again. Asking if this can be switched to famotidine. Will need new script sent in.

## 2020-05-15 RX ORDER — ATORVASTATIN CALCIUM 10 MG/1
TABLET, FILM COATED ORAL
Qty: 90 TABLET | Refills: 1 | Status: SHIPPED | OUTPATIENT
Start: 2020-05-15 | End: 2020-09-29 | Stop reason: SDUPTHER

## 2020-06-15 RX ORDER — ASPIRIN 81 MG/1
TABLET, DELAYED RELEASE ORAL
Qty: 90 TABLET | Refills: 1 | OUTPATIENT
Start: 2020-06-15

## 2020-06-15 NOTE — TELEPHONE ENCOUNTER
Pt needs an appt     Last visit: 12/04/2019  Last Med refill: 05/18/2020  Does patient have enough medication for 72 hours: Yes    CAN BE PURCHASED OTC    Next Visit Date:  No future appointments.     Health Maintenance   Topic Date Due    Pneumococcal 0-64 years Vaccine (1 of 1 - PPSV23) 10/23/1968    DTaP/Tdap/Td vaccine (1 - Tdap) 10/23/1981    Shingles Vaccine (1 of 2) 10/23/2012    Colon cancer screen colonoscopy  10/23/2012    Flu vaccine (Season Ended) 12/04/2020 (Originally 9/1/2020)    Lipid screen  12/04/2020    TSH testing  12/04/2020    Breast cancer screen  11/08/2021    Cervical cancer screen  07/13/2022    Hepatitis C screen  Completed    HIV screen  Completed    Hepatitis A vaccine  Aged Out    Hepatitis B vaccine  Aged Out    Hib vaccine  Aged Out    Meningococcal (ACWY) vaccine  Aged Out       Hemoglobin A1C (%)   Date Value   12/04/2019 5.6   12/10/2012 5.6             ( goal A1C is < 7)   No results found for: LABMICR  LDL Cholesterol (mg/dL)   Date Value   12/04/2019 134 (H)   08/02/2018 136 (H)       (goal LDL is <100)   AST (U/L)   Date Value   12/04/2019 25     ALT (U/L)   Date Value   12/04/2019 18     BUN (mg/dL)   Date Value   12/04/2019 13     BP Readings from Last 3 Encounters:   12/04/19 120/83   05/02/19 116/78   04/11/19 124/84          (goal 120/80)    All Future Testing planned in [facility]  Lab Frequency Next Occurrence   POCT FECAL IMMUNOCHEMICAL TEST (FIT) Once 06/15/2020               Patient Active Problem List:     Hypothyroidism     Hypercholesteremia     Allergic conjunctivitis of both eyes     Generalized pain     Other chest pain     Gastritis     Tobacco abuse

## 2020-06-26 ENCOUNTER — HOSPITAL ENCOUNTER (EMERGENCY)
Age: 60
Discharge: HOME | End: 2020-06-26
Attending: EMERGENCY MEDICINE
Payer: MEDICAID

## 2020-06-26 ENCOUNTER — APPOINTMENT (OUTPATIENT)
Dept: GENERAL RADIOLOGY | Age: 60
End: 2020-06-26
Payer: MEDICAID

## 2020-06-26 VITALS
RESPIRATION RATE: 20 BRPM | HEART RATE: 92 BPM | DIASTOLIC BLOOD PRESSURE: 69 MMHG | OXYGEN SATURATION: 91 % | TEMPERATURE: 99.9 F | SYSTOLIC BLOOD PRESSURE: 106 MMHG

## 2020-06-26 LAB
-: ABNORMAL
ABSOLUTE EOS #: 0.05 K/UL (ref 0–0.44)
ABSOLUTE IMMATURE GRANULOCYTE: 0.1 K/UL (ref 0–0.3)
ABSOLUTE LYMPH #: 0.86 K/UL (ref 1.1–3.7)
ABSOLUTE MONO #: 1.1 K/UL (ref 0.1–1.2)
ALBUMIN SERPL-MCNC: 4 G/DL (ref 3.5–5.2)
ALBUMIN/GLOBULIN RATIO: 1.4 (ref 1–2.5)
ALP BLD-CCNC: 63 U/L (ref 35–104)
ALT SERPL-CCNC: 19 U/L (ref 5–33)
AMORPHOUS: ABNORMAL
ANION GAP SERPL CALCULATED.3IONS-SCNC: 14 MMOL/L (ref 9–17)
AST SERPL-CCNC: 25 U/L
BACTERIA: ABNORMAL
BASOPHILS # BLD: 1 % (ref 0–2)
BASOPHILS ABSOLUTE: 0.08 K/UL (ref 0–0.2)
BILIRUB SERPL-MCNC: 0.68 MG/DL (ref 0.3–1.2)
BILIRUBIN URINE: NEGATIVE
BUN BLDV-MCNC: 7 MG/DL (ref 6–20)
BUN/CREAT BLD: ABNORMAL (ref 9–20)
CALCIUM SERPL-MCNC: 8.7 MG/DL (ref 8.6–10.4)
CASTS UA: ABNORMAL /LPF (ref 0–8)
CHLORIDE BLD-SCNC: 102 MMOL/L (ref 98–107)
CO2: 21 MMOL/L (ref 20–31)
COLOR: YELLOW
CREAT SERPL-MCNC: 0.61 MG/DL (ref 0.5–0.9)
CRYSTALS, UA: ABNORMAL /HPF
DIFFERENTIAL TYPE: ABNORMAL
EOSINOPHILS RELATIVE PERCENT: 0 % (ref 1–4)
EPITHELIAL CELLS UA: ABNORMAL /HPF (ref 0–5)
GFR AFRICAN AMERICAN: >60 ML/MIN
GFR NON-AFRICAN AMERICAN: >60 ML/MIN
GFR SERPL CREATININE-BSD FRML MDRD: ABNORMAL ML/MIN/{1.73_M2}
GFR SERPL CREATININE-BSD FRML MDRD: ABNORMAL ML/MIN/{1.73_M2}
GLUCOSE BLD-MCNC: 119 MG/DL (ref 70–99)
GLUCOSE URINE: NEGATIVE
HCT VFR BLD CALC: 45.5 % (ref 36.3–47.1)
HEMOGLOBIN: 15 G/DL (ref 11.9–15.1)
IMMATURE GRANULOCYTES: 1 %
KETONES, URINE: NEGATIVE
LEUKOCYTE ESTERASE, URINE: NEGATIVE
LYMPHOCYTES # BLD: 6 % (ref 24–43)
MCH RBC QN AUTO: 33.3 PG (ref 25.2–33.5)
MCHC RBC AUTO-ENTMCNC: 33 G/DL (ref 28.4–34.8)
MCV RBC AUTO: 100.9 FL (ref 82.6–102.9)
MONOCYTES # BLD: 8 % (ref 3–12)
MUCUS: ABNORMAL
NITRITE, URINE: NEGATIVE
NRBC AUTOMATED: 0 PER 100 WBC
OTHER OBSERVATIONS UA: ABNORMAL
PDW BLD-RTO: 14.2 % (ref 11.8–14.4)
PH UA: 7 (ref 5–8)
PLATELET # BLD: 193 K/UL (ref 138–453)
PLATELET ESTIMATE: ABNORMAL
PMV BLD AUTO: 9.9 FL (ref 8.1–13.5)
POTASSIUM SERPL-SCNC: 3.7 MMOL/L (ref 3.7–5.3)
PROTEIN UA: NEGATIVE
RBC # BLD: 4.51 M/UL (ref 3.95–5.11)
RBC # BLD: ABNORMAL 10*6/UL
RBC UA: ABNORMAL /HPF (ref 0–4)
RENAL EPITHELIAL, UA: ABNORMAL /HPF
SEG NEUTROPHILS: 84 % (ref 36–65)
SEGMENTED NEUTROPHILS ABSOLUTE COUNT: 12.43 K/UL (ref 1.5–8.1)
SODIUM BLD-SCNC: 137 MMOL/L (ref 135–144)
SPECIFIC GRAVITY UA: 1.02 (ref 1–1.03)
TOTAL PROTEIN: 6.9 G/DL (ref 6.4–8.3)
TRICHOMONAS: ABNORMAL
TURBIDITY: CLEAR
URINE HGB: ABNORMAL
UROBILINOGEN, URINE: NORMAL
WBC # BLD: 14.6 K/UL (ref 3.5–11.3)
WBC # BLD: ABNORMAL 10*3/UL
WBC UA: ABNORMAL /HPF (ref 0–5)
YEAST: ABNORMAL

## 2020-06-26 PROCEDURE — 81001 URINALYSIS AUTO W/SCOPE: CPT

## 2020-06-26 PROCEDURE — 71046 X-RAY EXAM CHEST 2 VIEWS: CPT

## 2020-06-26 PROCEDURE — 6370000000 HC RX 637 (ALT 250 FOR IP): Performed by: EMERGENCY MEDICINE

## 2020-06-26 PROCEDURE — 99283 EMERGENCY DEPT VISIT LOW MDM: CPT

## 2020-06-26 PROCEDURE — 80053 COMPREHEN METABOLIC PANEL: CPT

## 2020-06-26 PROCEDURE — 85025 COMPLETE CBC W/AUTO DIFF WBC: CPT

## 2020-06-26 RX ORDER — DOXYCYCLINE HYCLATE 100 MG
100 TABLET ORAL ONCE
Status: COMPLETED | OUTPATIENT
Start: 2020-06-26 | End: 2020-06-26

## 2020-06-26 RX ORDER — IBUPROFEN 800 MG/1
800 TABLET ORAL ONCE
Status: COMPLETED | OUTPATIENT
Start: 2020-06-26 | End: 2020-06-26

## 2020-06-26 RX ORDER — ACETAMINOPHEN 500 MG
1000 TABLET ORAL ONCE
Status: COMPLETED | OUTPATIENT
Start: 2020-06-26 | End: 2020-06-26

## 2020-06-26 RX ORDER — DOXYCYCLINE 100 MG/1
100 TABLET ORAL 2 TIMES DAILY
Qty: 14 TABLET | Refills: 0 | Status: SHIPPED | OUTPATIENT
Start: 2020-06-26 | End: 2020-07-03

## 2020-06-26 RX ORDER — 0.9 % SODIUM CHLORIDE 0.9 %
1000 INTRAVENOUS SOLUTION INTRAVENOUS ONCE
Status: DISCONTINUED | OUTPATIENT
Start: 2020-06-26 | End: 2020-06-26

## 2020-06-26 RX ADMIN — ACETAMINOPHEN 1000 MG: 500 TABLET ORAL at 10:44

## 2020-06-26 RX ADMIN — IBUPROFEN 800 MG: 800 TABLET, FILM COATED ORAL at 10:44

## 2020-06-26 RX ADMIN — DOXYCYCLINE HYCLATE 100 MG: 100 TABLET, COATED ORAL at 12:32

## 2020-06-26 ASSESSMENT — ENCOUNTER SYMPTOMS
RHINORRHEA: 1
COUGH: 0
NAUSEA: 0
VOMITING: 0
DIARRHEA: 0
SHORTNESS OF BREATH: 0
SORE THROAT: 1

## 2020-06-26 ASSESSMENT — PAIN DESCRIPTION - PAIN TYPE: TYPE: ACUTE PAIN

## 2020-06-26 ASSESSMENT — PAIN SCALES - GENERAL
PAINLEVEL_OUTOF10: 7
PAINLEVEL_OUTOF10: 9

## 2020-06-26 ASSESSMENT — PAIN DESCRIPTION - LOCATION: LOCATION: GENERALIZED

## 2020-06-26 NOTE — ED NOTES
Pt to ED complains of bodyaches/fever x2-3 days. Pt denies any respiratory symptoms. Pt appears generally ill and hot to touch. Pt is alert and oriented x4, no acute signs of distress noted. Family at bedside.       Romina Polk RN  06/26/20 [id number]

## 2020-06-26 NOTE — ED PROVIDER NOTES
[facility]  ED  Emergency Department Encounter  Emergency Medicine Resident     Patient Name: Elva Priest  MRN: [id number]  [place]: 1960  Date of evaluation: 20  PCP:  Deshawn Randall MD  Other: Patient was seen during COVID-19 pandemic    CHIEF COMPLAINT       Chief Complaint   Patient presents with    Generalized Body Aches     All over body aches    Fever       HISTORY OF PRESENT ILLNESS  (Location/Symptom, Timing/Onset, Context/Setting, Quality, Duration, Modifying Factors, Severity.)      Elva Priest is a 64 y.o. female who presents with chief complaint of generalized body aches and fever. Symptom onset 3 days ago along with runny nose. Patient denies associated cough, shortness of breath, chest pain, nausea, vomiting, diarrhea, dysuria. Patient has been taking ibuprofen and Tylenol but has not had any today. PAST MEDICAL / SURGICAL / SOCIAL / FAMILY HISTORY      has a past medical history of Hyperlipidemia, Hypothyroidism, and Subdural hematoma ([address] has a past surgical history that includes Cervical spine surgery and Hysterectomy. Social History     Socioeconomic History    Marital status:      Spouse name: Not on file    Number of children: Not on file    Years of education: Not on file    Highest education level: Not on file   Occupational History    Not on file   Social Needs    Financial resource strain: Not on file    Food insecurity     Worry: Not on file     Inability: Not on file    Transportation needs     Medical: Not on file     Non-medical: Not on file   Tobacco Use    Smoking status: Current Every Day Smoker     Packs/day: 0.50     Years: 34.00     Pack years: 17.00     Types: Cigarettes     Last attempt to quit: 2011     Years since quittin.0    Smokeless tobacco: Never Used   Substance and Sexual Activity    Alcohol use: Yes     Alcohol/week: 2.0 standard drinks     Types: 2 Cans of beer per week    Drug use:  No  Sexual activity: Not on file   Lifestyle    Physical activity     Days per week: Not on file     Minutes per session: Not on file    Stress: Not on file   Relationships    Social connections     Talks on phone: Not on file     Gets together: Not on file     Attends [employer]ism service: Not on file     Active member of club or organization: Not on file     Attends meetings of clubs or organizations: Not on file     Relationship status: Not on file    Intimate partner violence     Fear of current or ex partner: Not on file     Emotionally abused: Not on file     Physically abused: Not on file     Forced sexual activity: Not on file   Other Topics Concern    Not on file   Social History Narrative    Not on file       Family History   Problem Relation Age of Onset    Heart Disease Mother     Cancer Mother         stomach breast    Kidney Disease Sister     High Blood Pressure Sister     High Blood Pressure Brother        Allergies:  Patient has no known allergies. Home Medications:  Prior to Admission medications    Medication Sig Start Date End Date Taking?  Authorizing Provider   doxycycline monohydrate (ADOXA) 100 MG tablet Take 1 tablet by mouth 2 times daily for 7 days 6/26/20 7/3/20 Yes Letitia Libman, DO   atorvastatin (LIPITOR) 10 MG tablet take 1 tablet by mouth once daily 5/15/20   Nicole Vallejo MD   famotidine (PEPCID) 20 MG tablet Take 1 tablet by mouth 2 times daily 4/13/20   Nicole Vallejo MD   aspirin EC 81 MG EC tablet Take 1 tablet by mouth daily 12/4/19   Nicole Vallejo MD   fluticasone [facility] [place]) 50 MCG/ACT nasal spray instill 1 spray into each nostril once daily 12/4/19   Nicole Vallejo MD   levothyroxine (SYNTHROID) 88 MCG tablet Take 1 tablet by mouth Daily 12/4/19   Nicole Vallejo MD   cetirizine (RA ALLERGY RELIEF) 10 MG tablet Take 1 tablet by mouth daily 12/4/19   Nicole Vallejo MD   amLODIPine (NORVASC) 2.5 MG tablet Take 1 tablet by mouth daily 12/4/19   Nicole

## 2020-06-27 ENCOUNTER — CARE COORDINATION (OUTPATIENT)
Dept: CARE COORDINATION | Age: 60
End: 2020-06-27

## 2020-06-27 NOTE — CARE COORDINATION
Patient contacted regarding recent visit for viral symptoms. This Sandra Watt contacted the patient by telephone to perform post discharge call. Verified name and  with patient as identifiers. Provided introduction to self, and reason for call due to viral symptoms of infection and/or exposure to COVID-19. Patient presented to emergency department/flu clinic with complaints of viral symptoms/exposure to COVID. Patient reports symptoms are improving. Due to no new or worsening symptoms the RN CTN/ACM was not notified for escalation. Discussed exposure protocols and quarantine with CDC Guidelines What To Do If You Are Sick    Patient was given an opportunity for questions and concerns. Stay home except to get medical care    Separate yourself from other people and animals in your home    Call ahead before visiting your doctor    Wear a facemask    Cover your coughs and sneezes    Clean your hands often    Avoid sharing personal household items    Clean all high-touch surfaces everyday    Monitor your symptoms  Seek prompt medical attention if your illness is worsening (e.g., difficulty breathing). Before seeking care, call your healthcare provider and tell them that you have, or are being evaluated for, COVID-19. Put on a facemask before you enter the facility. These steps will help the healthcare provider's office to keep other people in the office or waiting room from getting infected or exposed. Ask your healthcare provider to call the local or [facility] health department. Persons who are placed under If you have a medical emergency and need to call 911, notify the dispatch personnel that you have, or are being evaluated for COVID-19. If possible, put on a facemask before emergency medical services arrive. The patient agrees to contact the [facility] exposure line [phone], local health department [place] [facility] Health: ([phone]) and PCP office for questions related to their healthcare.

## 2020-07-15 RX ORDER — LEVOTHYROXINE SODIUM 88 UG/1
TABLET ORAL
Qty: 90 TABLET | Refills: 1 | Status: SHIPPED | OUTPATIENT
Start: 2020-07-15 | End: 2020-09-29 | Stop reason: SDUPTHER

## 2020-07-15 NOTE — TELEPHONE ENCOUNTER
Last visit: 12/4/19  Last Med refill: 12/4/19  Does patient have enough medication for 72 hours: No:     Next Visit Date:  Future Appointments   Date Time Provider Coretta Orozco   8/12/2020  1:15 PM Nicole Pope  [address] Maintenance   Topic Date Due    Pneumococcal 0-64 years Vaccine (1 of 1 - PPSV23) 10/23/1968    DTaP/Tdap/Td vaccine (1 - Tdap) 10/23/1981    Shingles Vaccine (1 of 2) 10/23/2012    Colon cancer screen colonoscopy  10/23/2012    Flu vaccine (1) 09/01/2020    Lipid screen  12/04/2020    TSH testing  12/04/2020    Breast cancer screen  11/08/2021    Cervical cancer screen  07/13/2022    Diabetes screen  12/04/2022    Hepatitis C screen  Completed    HIV screen  Completed    Hepatitis A vaccine  Aged Out    Hepatitis B vaccine  Aged Out    Hib vaccine  Aged Out    Meningococcal (ACWY) vaccine  Aged Out       Hemoglobin A1C (%)   Date Value   12/04/2019 5.6   12/10/2012 5.6             ( goal A1C is < 7)   No results found for: LABMICR  LDL Cholesterol (mg/dL)   Date Value   12/04/2019 134 (H)   08/02/2018 136 (H)       (goal LDL is <100)   AST (U/L)   Date Value   06/26/2020 25     ALT (U/L)   Date Value   06/26/2020 19     BUN (mg/dL)   Date Value   06/26/2020 7     BP Readings from Last 3 Encounters:   06/26/20 106/69   12/04/19 120/83   05/02/19 116/78          (goal 120/80)    All Future Testing planned in [facility]  Lab Frequency Next Occurrence   POCT FECAL IMMUNOCHEMICAL TEST (FIT) Once 07/26/2020               Patient Active Problem List:     Hypothyroidism     Hypercholesteremia     Allergic conjunctivitis of both eyes     Generalized pain     Other chest pain     Gastritis     Tobacco abuse

## 2020-08-12 ENCOUNTER — TELEPHONE (OUTPATIENT)
Dept: FAMILY MEDICINE CLINIC | Age: 60
End: 2020-08-12

## 2020-08-12 NOTE — TELEPHONE ENCOUNTER
Patient called to inform she just found out she no longer has insurance.  She will do some research to see what happened

## 2020-09-23 RX ORDER — NAPROXEN 500 MG/1
TABLET ORAL
Qty: 180 TABLET | Refills: 1 | Status: SHIPPED | OUTPATIENT
Start: 2020-09-23 | End: 2021-03-16

## 2020-09-23 RX ORDER — ASPIRIN 81 MG/1
TABLET, DELAYED RELEASE ORAL
Qty: 90 TABLET | Refills: 1 | Status: SHIPPED | OUTPATIENT
Start: 2020-09-23 | End: 2021-02-22
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
## 2020-09-29 ENCOUNTER — OFFICE VISIT (OUTPATIENT)
Dept: FAMILY MEDICINE CLINIC | Age: 60
End: 2020-09-29
Payer: MEDICARE

## 2020-09-29 VITALS
HEART RATE: 87 BPM | WEIGHT: 149.8 LBS | HEIGHT: 60 IN | DIASTOLIC BLOOD PRESSURE: 78 MMHG | BODY MASS INDEX: 29.41 KG/M2 | OXYGEN SATURATION: 99 % | TEMPERATURE: 97.9 F | SYSTOLIC BLOOD PRESSURE: 118 MMHG

## 2020-09-29 PROCEDURE — 99214 OFFICE O/P EST MOD 30 MIN: CPT | Performed by: INTERNAL MEDICINE

## 2020-09-29 PROCEDURE — G8417 CALC BMI ABV UP PARAM F/U: HCPCS | Performed by: INTERNAL MEDICINE

## 2020-09-29 PROCEDURE — G8427 DOCREV CUR MEDS BY ELIG CLIN: HCPCS | Performed by: INTERNAL MEDICINE

## 2020-09-29 PROCEDURE — 3017F COLORECTAL CA SCREEN DOC REV: CPT | Performed by: INTERNAL MEDICINE

## 2020-09-29 PROCEDURE — 4004F PT TOBACCO SCREEN RCVD TLK: CPT | Performed by: INTERNAL MEDICINE

## 2020-09-29 RX ORDER — VARENICLINE TARTRATE
KIT
Qty: 42 TABLET | Refills: 0 | Status: SHIPPED | OUTPATIENT
Start: 2020-09-29 | End: 2021-04-26 | Stop reason: ALTCHOICE

## 2020-09-29 RX ORDER — FAMOTIDINE 20 MG/1
20 TABLET, FILM COATED ORAL 2 TIMES DAILY
Qty: 180 TABLET | Refills: 1 | Status: SHIPPED | OUTPATIENT
Start: 2020-09-29 | End: 2021-04-26 | Stop reason: SDUPTHER

## 2020-09-29 RX ORDER — FLUTICASONE PROPIONATE 50 MCG
SPRAY, SUSPENSION (ML) NASAL
Qty: 16 G | Refills: 5 | Status: SHIPPED | OUTPATIENT
Start: 2020-09-29 | End: 2021-03-29

## 2020-09-29 RX ORDER — LEVOTHYROXINE SODIUM 88 UG/1
88 TABLET ORAL DAILY
Qty: 90 TABLET | Refills: 1 | Status: SHIPPED | OUTPATIENT
Start: 2020-09-29 | End: 2021-04-26 | Stop reason: SDUPTHER

## 2020-09-29 RX ORDER — TIZANIDINE 2 MG/1
2 TABLET ORAL 3 TIMES DAILY PRN
Qty: 30 TABLET | Refills: 0 | Status: SHIPPED | OUTPATIENT
Start: 2020-09-29 | End: 2021-04-26 | Stop reason: SDUPTHER

## 2020-09-29 RX ORDER — AMLODIPINE BESYLATE 2.5 MG/1
2.5 TABLET ORAL DAILY
Qty: 90 TABLET | Refills: 1 | Status: SHIPPED | OUTPATIENT
Start: 2020-09-29 | End: 2021-02-05

## 2020-09-29 RX ORDER — CETIRIZINE HYDROCHLORIDE 10 MG/1
10 TABLET ORAL DAILY
Qty: 90 TABLET | Refills: 1 | Status: SHIPPED | OUTPATIENT
Start: 2020-09-29 | End: 2021-04-26 | Stop reason: SDUPTHER

## 2020-09-29 RX ORDER — ATORVASTATIN CALCIUM 10 MG/1
10 TABLET, FILM COATED ORAL DAILY
Qty: 90 TABLET | Refills: 1 | Status: SHIPPED | OUTPATIENT
Start: 2020-09-29 | End: 2021-04-26 | Stop reason: SDUPTHER

## 2020-09-29 RX ORDER — POLYETHYLENE GLYCOL 3350 17 G
4 POWDER IN PACKET (EA) ORAL
Qty: 200 LOZENGE | Refills: 3 | Status: SHIPPED | OUTPATIENT
Start: 2020-09-29 | End: 2021-04-26 | Stop reason: ALTCHOICE

## 2020-09-29 ASSESSMENT — ENCOUNTER SYMPTOMS
BACK PAIN: 1
CHEST TIGHTNESS: 0
ABDOMINAL PAIN: 0
BLURRED VISION: 0
COUGH: 0
VOMITING: 0
CONSTIPATION: 0
BLOOD IN STOOL: 0
ORTHOPNEA: 0
BOWEL INCONTINENCE: 0
SHORTNESS OF BREATH: 0
DIARRHEA: 0
CHOKING: 0
NAUSEA: 0
WHEEZING: 0
ANAL BLEEDING: 0

## 2020-09-29 ASSESSMENT — PATIENT HEALTH QUESTIONNAIRE - PHQ9
SUM OF ALL RESPONSES TO PHQ QUESTIONS 1-9: 0
1. LITTLE INTEREST OR PLEASURE IN DOING THINGS: 0
2. FEELING DOWN, DEPRESSED OR HOPELESS: 0
SUM OF ALL RESPONSES TO PHQ9 QUESTIONS 1 & 2: 0
SUM OF ALL RESPONSES TO PHQ QUESTIONS 1-9: 0

## 2020-09-29 ASSESSMENT — VISUAL ACUITY: OU: 1

## 2020-09-29 NOTE — PROGRESS NOTES
Pt is here today for a check up. She is having lower back pain on the RT side  She has a little something under her LT eye.   Depression screening done  Declined Flu and PPSV23

## 2020-09-29 NOTE — PROGRESS NOTES
Subjective:       Patient ID:     Elton Reynoso is a 64 y.o. female who presents for   Chief Complaint   Patient presents with    Check-Up    Back Pain     lower RT side    Eye Problem     little spot under LT eye       HPI:  Nursing note reviewed and discussed with patient. Lesion on face skin under L eye - has been there since spring. Does not hurt or itch, has not grown. Wants it removed if possible. No vision changes. Hypothyroidism - Patient complains of hypothyroidism. Current symptoms: none. Patient denies change in energy level, diarrhea, heat / cold intolerance, nervousness, palpitations and weight changes. Onset of symptoms was several years ago. Symptoms have been well-controlled. Back Pain   This is a new problem. The current episode started more than 1 month ago. The problem occurs intermittently. The problem has been waxing and waning since onset. The pain is present in the lumbar spine (right side). The quality of the pain is described as aching and cramping. The pain does not radiate. The pain is moderate. The symptoms are aggravated by bending, twisting, position and sitting (only when it hurts). Associated symptoms include headaches. Pertinent negatives include no abdominal pain, bladder incontinence, bowel incontinence, chest pain, dysuria, fever, leg pain, numbness, paresis, paresthesias, pelvic pain, perianal numbness, tingling, weakness or weight loss. She has tried NSAIDs for the symptoms. Hypertension   This is a chronic problem. The current episode started more than 1 year ago. The problem is unchanged. The problem is controlled. Associated symptoms include headaches. Pertinent negatives include no anxiety, blurred vision, chest pain, malaise/fatigue, neck pain, orthopnea, palpitations, peripheral edema, PND, shortness of breath or sweats. There are no associated agents to hypertension.  Risk factors for coronary artery disease include dyslipidemia, smoking/tobacco exposure and post-menopausal state. Past treatments include calcium channel blockers. Hyperlipidemia   This is a chronic problem. The current episode started more than 1 year ago. The problem is controlled. Exacerbating diseases include hypothyroidism. Factors aggravating her hyperlipidemia include smoking. Pertinent negatives include no chest pain, leg pain, myalgias or shortness of breath. Current antihyperlipidemic treatment includes statins. There are no compliance problems. Patient's medications, allergies, past medical, surgical, social and family histories were reviewed and updated as appropriate. Past Medical History:   Diagnosis Date    Hyperlipidemia     Hypothyroidism     Subdural hematoma ([address] 2016     Past Surgical History:   Procedure Laterality Date    CERVICAL SPINE SURGERY      HYSTERECTOMY      partial       Social History     Tobacco Use    Smoking status: Current Every Day Smoker     Packs/day: 0.50     Years: 34.00     Pack years: 17.00     Types: Cigarettes     Last attempt to quit: 2011     Years since quittin.3    Smokeless tobacco: Never Used   Substance Use Topics    Alcohol use: Yes     Alcohol/week: 2.0 standard drinks     Types: 2 Cans of beer per week      Patient Active Problem List   Diagnosis    Hypothyroidism    Hypercholesteremia    Allergic conjunctivitis of both eyes    Generalized pain    Other chest pain    Gastritis    Tobacco abuse         Prior to Visit Medications    Medication Sig Taking?  Authorizing Provider   SM ASPIRIN ADULT LOW STRENGTH 81 MG EC tablet take 1 tablet by mouth once daily Yes Nicole Pedroza MD   naproxen (NAPROSYN) 500 MG tablet take 1 tablet by mouth twice a day with meals Yes Nicole Pedroza MD   levothyroxine (SYNTHROID) 88 MCG tablet take 1 tablet by mouth once daily Yes Nicole Pedroza MD   RA ALLERGY RELIEF 10 MG tablet take 1 tablet by mouth once daily Yes Ld Villegas MD   atorvastatin (LIPITOR) 10 MG tablet take 1 tablet by mouth once daily Yes Ld Villegas MD   famotidine (PEPCID) 20 MG tablet Take 1 tablet by mouth 2 times daily Yes Ld Villegas MD   fluticasone Carine Leyland) 50 MCG/ACT nasal spray instill 1 spray into each nostril once daily Yes Ld Villegas MD   amLODIPine (NORVASC) 2.5 MG tablet Take 1 tablet by mouth daily Yes Ld Villegas MD   nitroGLYCERIN (NITROSTAT) 0.4 MG SL tablet Place 1 tablet under the tongue as needed for Chest pain Yes Ld Villegas MD     Review of Systems  Review of Systems   Constitutional: Negative for fatigue, fever, malaise/fatigue, unexpected weight change and weight loss. Eyes: Negative for blurred vision. Respiratory: Negative for cough, choking, chest tightness, shortness of breath and wheezing. Cardiovascular: Negative for chest pain, palpitations, orthopnea, leg swelling and PND. Gastrointestinal: Negative for abdominal pain, anal bleeding, blood in stool, bowel incontinence, constipation, diarrhea, nausea and vomiting. Endocrine: Negative. Genitourinary: Negative for bladder incontinence, dysuria and pelvic pain. Musculoskeletal: Positive for back pain. Negative for joint swelling, myalgias and neck pain. Skin: Negative. Neurological: Positive for headaches. Negative for dizziness, tingling, weakness, numbness and paresthesias. Psychiatric/Behavioral: Negative for sleep disturbance. All other systems reviewed and are negative. Objective:       Physical Exam:  /78 (Site: Right Upper Arm, Position: Sitting, Cuff Size: Medium Adult)   Pulse 87   Temp 97.9 °F (36.6 °C) (Temporal)   Ht 5' (1.524 m)   Wt 149 lb 12.8 oz (67.9 kg)   SpO2 99%   BMI 29.26 kg/m²   Physical Exam  Vitals signs and nursing note reviewed. Constitutional:       General: She is not in acute distress. Appearance: She is well-developed. She is not ill-appearing. Eyes:      General: Lids are normal. Vision grossly intact.  Allergic shiner present. Cardiovascular:      Rate and Rhythm: Normal rate and regular rhythm. Heart sounds: Normal heart sounds, S1 normal and S2 normal. No murmur. No friction rub. No gallop. Pulmonary:      Effort: Pulmonary effort is normal. No respiratory distress. Breath sounds: Normal breath sounds. No wheezing. Abdominal:      General: Bowel sounds are normal.      Palpations: Abdomen is soft. There is no mass. Tenderness: There is no abdominal tenderness. There is no guarding. Musculoskeletal: Normal range of motion. Skin:     General: Skin is warm and dry. Capillary Refill: Capillary refill takes less than 2 seconds. Comments: 2mm flesh colored papule just under medial epicanthus of L eye    Neurological:      General: No focal deficit present. Mental Status: She is alert and oriented to person, place, and time. Data Review  not applicable       Assessment/Plan:      1. Other chest pain  - amLODIPine (NORVASC) 2.5 MG tablet; Take 1 tablet by mouth daily  Dispense: 90 tablet; Refill: 1    2. Allergic conjunctivitis of both eyes  - fluticasone (FLONASE) 50 MCG/ACT nasal spray; instill 1 spray into each nostril once daily  Dispense: 16 g; Refill: 5  - cetirizine (RA ALLERGY RELIEF) 10 MG tablet; Take 1 tablet by mouth daily  Dispense: 90 tablet; Refill: 1    3. Hypercholesteremia  - atorvastatin (LIPITOR) 10 MG tablet; Take 1 tablet by mouth daily  Dispense: 90 tablet; Refill: 1  - Lipid, Fasting; Future  - Comprehensive Metabolic Panel; Future    4. Hypothyroidism, unspecified type  - levothyroxine (SYNTHROID) 88 MCG tablet; Take 1 tablet by mouth Daily  Dispense: 90 tablet; Refill: 1  - TSH With Reflex Ft4; Future    5. Chronic right-sided low back pain without sciatica  - tiZANidine (ZANAFLEX) 2 MG tablet; Take 1 tablet by mouth 3 times daily as needed (back pain)  Dispense: 30 tablet; Refill: 0    6.  Tobacco abuse  - varenicline (CHANTIX STARTING MONTH PAK) 0.5 MG X 11 & 1 MG X 42 tablet; Take by mouth as directed by package. Dispense: 42 tablet; Refill: 0  - nicotine polacrilex (NICORETTE) 2 MG lozenge; Take 2 lozenges by mouth every hour as needed for Smoking cessation  Dispense: 200 lozenge; Refill: 3    7. Heartburn  - famotidine (PEPCID) 20 MG tablet; Take 1 tablet by mouth 2 times daily  Dispense: 180 tablet; Refill: 1    8. Colon cancer screening  - Cologuard (For External Results Only); Future    9. Screening, anemia, deficiency, iron  - CBC With Auto Differential; Future    10.  Skin lesion of face  - Jennefer Meckel, MD, Dermatology, [facility] Maintenance Due   Topic Date Due    DTaP/Tdap/Td vaccine (1 - Tdap) 10/23/1981    Shingles Vaccine (1 of 2) 10/23/2012    Colon cancer screen colonoscopy  10/23/2012       Electronically signed by Carla Ellis MD on 9/29/2020 at 1:43 PM

## 2020-09-29 NOTE — PATIENT INSTRUCTIONS
Patient Education        Low Back Pain: Exercises  Introduction  Here are some examples of exercises for you to try. The exercises may be suggested for a condition or for rehabilitation. Start each exercise slowly. Ease off the exercises if you start to have pain. You will be told when to start these exercises and which ones will work best for you. How to do the exercises  Press-up   1. Lie on your stomach, supporting your body with your forearms. 2. Press your elbows down into the floor to raise your upper back. As you do this, relax your stomach muscles and allow your back to arch without using your back muscles. As your press up, do not let your hips or pelvis come off the floor. 3. Hold for 15 to 30 seconds, then relax. 4. Repeat 2 to 4 times. Alternate arm and leg (bird dog) exercise   Do this exercise slowly. Try to keep your body straight at all times, and do not let one hip drop lower than the other. 1. Start on the floor, on your hands and knees. 2. Tighten your belly muscles. 3. Raise one leg off the floor, and hold it straight out behind you. Be careful not to let your hip drop down, because that will twist your trunk. 4. Hold for about 6 seconds, then lower your leg and switch to the other leg. 5. Repeat 8 to 12 times on each leg. 6. Over time, work up to holding for 10 to 30 seconds each time. 7. If you feel stable and secure with your leg raised, try raising the opposite arm straight out in front of you at the same time. Knee-to-chest exercise   1. Lie on your back with your knees bent and your feet flat on the floor. 2. Bring one knee to your chest, keeping the other foot flat on the floor (or keeping the other leg straight, whichever feels better on your lower back). 3. Keep your lower back pressed to the floor. Hold for at least 15 to 30 seconds. 4. Relax, and lower the knee to the starting position. 5. Repeat with the other leg. Repeat 2 to 4 times with each leg.   6. To get more stretch, put your other leg flat on the floor while pulling your knee to your chest.    Curl-ups   1. Lie on the floor on your back with your knees bent at a 90-degree angle. Your feet should be flat on the floor, about 12 inches from your buttocks. 2. Cross your arms over your chest. If this bothers your neck, try putting your hands behind your neck (not your head), with your elbows spread apart. 3. Slowly tighten your belly muscles and raise your shoulder blades off the floor. 4. Keep your head in line with your body, and do not press your chin to your chest.  5. Hold this position for 1 or 2 seconds, then slowly lower yourself back down to the floor. 6. Repeat 8 to 12 times. Pelvic tilt exercise   1. Lie on your back with your knees bent. 2. \"Brace\" your stomach. This means to tighten your muscles by pulling in and imagining your belly button moving toward your spine. You should feel like your back is pressing to the floor and your hips and pelvis are rocking back. 3. Hold for about 6 seconds while you breathe smoothly. 4. Repeat 8 to 12 times. Heel dig bridging   1. Lie on your back with both knees bent and your ankles bent so that only your heels are digging into the floor. Your knees should be bent about 90 degrees. 2. Then push your heels into the floor, squeeze your buttocks, and lift your hips off the floor until your shoulders, hips, and knees are all in a straight line. 3. Hold for about 6 seconds as you continue to breathe normally, and then slowly lower your hips back down to the floor and rest for up to 10 seconds. 4. Do 8 to 12 repetitions. Hamstring stretch in doorway   1. Lie on your back in a doorway, with one leg through the open door. 2. Slide your leg up the wall to straighten your knee. You should feel a gentle stretch down the back of your leg. 3. Hold the stretch for at least 15 to 30 seconds. Do not arch your back, point your toes, or bend either knee.  Keep one heel touching the floor and the other heel touching the wall. 4. Repeat with your other leg. 5. Do 2 to 4 times for each leg. Hip flexor stretch   1. Kneel on the floor with one knee bent and one leg behind you. Place your forward knee over your foot. Keep your other knee touching the floor. 2. Slowly push your hips forward until you feel a stretch in the upper thigh of your rear leg. 3. Hold the stretch for at least 15 to 30 seconds. Repeat with your other leg. 4. Do 2 to 4 times on each side. Wall sit   1. Stand with your back 10 to 12 inches away from a wall. 2. Lean into the wall until your back is flat against it. 3. Slowly slide down until your knees are slightly bent, pressing your lower back into the wall. 4. Hold for about 6 seconds, then slide back up the wall. 5. Repeat 8 to 12 times. Follow-up care is a key part of your treatment and safety. Be sure to make and go to all appointments, and call your doctor if you are having problems. It's also a good idea to know your test results and keep a list of the medicines you take. Where can you learn more? Go to [email or web address].[email or web address] org and sign in to your [employer] account. Enter R772 in the iWarda box to learn more about \"Low Back Pain: Exercises. \"     If you do not have an account, please click on the \"Sign Up Now\" link. Current as of: March 2, 2020               Content Version: 12.5  © 2006-2020 [employer]. Care instructions adapted under license by [facility] ([facility]). If you have questions about a medical condition or this instruction, always ask your healthcare professional. [place] [address] any warranty or liability for your use of this information.

## 2020-11-10 ENCOUNTER — HOSPITAL ENCOUNTER (OUTPATIENT)
Dept: MAMMOGRAPHY | Age: 60
Discharge: HOME | End: 2020-11-12
Payer: MEDICARE

## 2020-11-10 PROCEDURE — 77063 BREAST TOMOSYNTHESIS BI: CPT

## 2020-11-23 ENCOUNTER — HOSPITAL ENCOUNTER (OUTPATIENT)
Age: 60
Setting detail: SPECIMEN
Discharge: HOME | End: 2020-11-23
Payer: MEDICARE

## 2020-11-23 ENCOUNTER — OFFICE VISIT (OUTPATIENT)
Dept: PRIMARY CARE CLINIC | Age: 60
End: 2020-11-23
Payer: MEDICARE

## 2020-11-23 VITALS
BODY MASS INDEX: 26.5 KG/M2 | OXYGEN SATURATION: 96 % | WEIGHT: 135 LBS | TEMPERATURE: 97 F | HEIGHT: 60 IN | HEART RATE: 82 BPM

## 2020-11-23 PROCEDURE — G8417 CALC BMI ABV UP PARAM F/U: HCPCS | Performed by: FAMILY MEDICINE

## 2020-11-23 PROCEDURE — 99214 OFFICE O/P EST MOD 30 MIN: CPT | Performed by: FAMILY MEDICINE

## 2020-11-23 PROCEDURE — G8427 DOCREV CUR MEDS BY ELIG CLIN: HCPCS | Performed by: FAMILY MEDICINE

## 2020-11-23 PROCEDURE — 4004F PT TOBACCO SCREEN RCVD TLK: CPT | Performed by: FAMILY MEDICINE

## 2020-11-23 PROCEDURE — G8484 FLU IMMUNIZE NO ADMIN: HCPCS | Performed by: FAMILY MEDICINE

## 2020-11-23 PROCEDURE — 3017F COLORECTAL CA SCREEN DOC REV: CPT | Performed by: FAMILY MEDICINE

## 2020-11-23 RX ORDER — AMOXICILLIN AND CLAVULANATE POTASSIUM 875; 125 MG/1; MG/1
1 TABLET, FILM COATED ORAL 2 TIMES DAILY
Qty: 14 TABLET | Refills: 0 | Status: SHIPPED | OUTPATIENT
Start: 2020-11-23 | End: 2020-11-30

## 2020-11-23 ASSESSMENT — PATIENT HEALTH QUESTIONNAIRE - PHQ9
SUM OF ALL RESPONSES TO PHQ QUESTIONS 1-9: 0
SUM OF ALL RESPONSES TO PHQ QUESTIONS 1-9: 0
2. FEELING DOWN, DEPRESSED OR HOPELESS: 0
SUM OF ALL RESPONSES TO PHQ QUESTIONS 1-9: 0
SUM OF ALL RESPONSES TO PHQ9 QUESTIONS 1 & 2: 0
1. LITTLE INTEREST OR PLEASURE IN DOING THINGS: 0

## 2020-11-23 ASSESSMENT — ENCOUNTER SYMPTOMS
ABDOMINAL PAIN: 0
SORE THROAT: 1
RHINORRHEA: 1
SINUS COMPLAINT: 1
SINUS PRESSURE: 1
VOMITING: 0
DIARRHEA: 1
COUGH: 1
NAUSEA: 0
SHORTNESS OF BREATH: 0

## 2020-11-23 NOTE — PATIENT INSTRUCTIONS
Take antibiotic as prescribed for suspected sinus infection  If symptoms worsen or do not improve please follow-up with PCP or return to clinic    You were tested for COVID today. We will call you with results when they are available. If you have not heard from us in 7 days, please call our office. Advance Care Planning  People with COVID-19 may have no symptoms, mild symptoms, such as fever, cough, and shortness of breath or they may have more severe illness, developing severe and fatal pneumonia. As a result, Advance Care Planning with attention to naming a health care decision maker (someone you trust to make healthcare decisions for you if you could not speak for yourself) and sharing other health care preferences is important BEFORE a possible health crisis. Please contact your Primary Care Provider to discuss Advance Care Planning. Preventing the Spread of Coronavirus Disease 2019 in Homes and Residential Communities  For the most recent information go to [email or web address]    Prevention steps for People with confirmed or suspected COVID-19 (including persons under investigation) who do not need to be hospitalized  and   People with confirmed COVID-19 who were hospitalized and determined to be medically stable to go home    Your healthcare provider and public health staff will evaluate whether you can be cared for at home. If it is determined that you do not need to be hospitalized and can be isolated at home, you will be monitored by staff from your local or state health department. You should follow the prevention steps below until a healthcare provider or local or state health department says you can return to your normal activities. Stay home except to get medical care  People who are mildly ill with COVID-19 are able to isolate at home during their illness. You should restrict activities outside your home, except for getting medical care.  Do not go to work, school, or public areas. Avoid using public transportation, ride-sharing, or taxis. Separate yourself from other people and animals in your home  People: As much as possible, you should stay in a specific room and away from other people in your home. Also, you should use a separate bathroom, if available. Animals: You should restrict contact with pets and other animals while you are sick with COVID-19, just like you would around other people. Although there have not been reports of pets or other animals becoming sick with COVID-19, it is still recommended that people sick with COVID-19 limit contact with animals until more information is known about the virus. When possible, have another member of your household care for your animals while you are sick. If you are sick with COVID-19, avoid contact with your pet, including petting, snuggling, being kissed or licked, and sharing food. If you must care for your pet or be around animals while you are sick, wash your hands before and after you interact with pets and wear a facemask. Call ahead before visiting your doctor  If you have a medical appointment, call the healthcare provider and tell them that you have or may have COVID-19. This will help the healthcare providers office take steps to keep other people from getting infected or exposed. Wear a facemask  You should wear a facemask when you are around other people (e.g., sharing a room or vehicle) or pets and before you enter a healthcare providers office. If you are not able to wear a facemask (for example, because it causes trouble breathing), then people who live with you should not stay in the same room with you, or they should wear a facemask if they enter your room. Cover your coughs and sneezes  Cover your mouth and nose with a tissue when you cough or sneeze. Throw used tissues in a lined trash can.  Immediately wash your hands with soap and water for at least 20 seconds or, if soap and water are not available, clean your hands with an alcohol-based hand  that contains at least 60% alcohol. Clean your hands often  Wash your hands often with soap and water for at least 20 seconds, especially after blowing your nose, coughing, or sneezing; going to the bathroom; and before eating or preparing food. If soap and water are not readily available, use an alcohol-based hand  with at least 60% alcohol, covering all surfaces of your hands and rubbing them together until they feel dry. Soap and water are the best option if hands are visibly dirty. Avoid touching your eyes, nose, and mouth with unwashed hands. Avoid sharing personal household items  You should not share dishes, drinking glasses, cups, eating utensils, towels, or bedding with other people or pets in your home. After using these items, they should be washed thoroughly with soap and water. Clean all high-touch surfaces everyday  High touch surfaces include counters, tabletops, doorknobs, bathroom fixtures, toilets, phones, keyboards, tablets, and bedside tables. Also, clean any surfaces that may have blood, stool, or body fluids on them. Use a household cleaning spray or wipe, according to the label instructions. Labels contain instructions for safe and effective use of the cleaning product including precautions you should take when applying the product, such as wearing gloves and making sure you have good ventilation during use of the product. Monitor your symptoms  Seek prompt medical attention if your illness is worsening (e.g., difficulty breathing). Before seeking care, call your healthcare provider and tell them that you have, or are being evaluated for, COVID-19. Put on a facemask before you enter the facility. These steps will help the healthcare providers office to keep other people in the office or waiting room from getting infected or exposed.  Ask your healthcare provider to call the local or [facility]

## 2020-11-23 NOTE — PROGRESS NOTES
[address] CLINIC  [address] julio cesar Hester [address] [address]  Dept: [phone]  Dept Fax: [phone]    Deja Powell is a 64 y.o. female who presents today for her medical conditions/complaintsas noted below. Deja Powell is c/o of Concern For COVID-19 ( congestion,ears popping, sore throat, X1 WEEK, POSITIVE EXPOSURE TO )        HPI:     Sinus Problem   This is a new problem. The current episode started 1 to 4 weeks ago (1 week). The problem is unchanged. There has been no fever. The pain is mild. Associated symptoms include congestion, coughing, ear pain (popping), sinus pressure and a sore throat. Pertinent negatives include no chills, headaches or shortness of breath. Treatments tried: several different OTC cold meds. The treatment provided no relief. PMHx of hypothyroidism  Reports COVID exposure from  who is currently in the hospital    Past Medical History:   Diagnosis Date    Hyperlipidemia     Hypothyroidism     Subdural hematoma ([address] 2016    Past medical history reviewed and pertinent positives/negatives in the HPI    Past Surgical History:   Procedure Laterality Date    CERVICAL SPINE SURGERY      HYSTERECTOMY      partial       Family History   Problem Relation Age of Onset    Heart Disease Mother     Cancer Mother         stomach breast    Kidney Disease Sister     High Blood Pressure Sister     High Blood Pressure Brother        Social History     Tobacco Use    Smoking status: Current Every Day Smoker     Packs/day: 0.50     Years: 34.00     Pack years: 17.00     Types: Cigarettes     Last attempt to quit: 2011     Years since quittin.4    Smokeless tobacco: Never Used   Substance Use Topics    Alcohol use:  Yes     Alcohol/week: 2.0 standard drinks     Types: 2 Cans of beer per week      Current Outpatient Medications   Medication Sig Dispense Refill    amoxicillin-clavulanate (AUGMENTIN) 875-125 MG per tablet Take 1 tablet by mouth 2 times daily for 7 days 14 tablet 0    amLODIPine (NORVASC) 2.5 MG tablet Take 1 tablet by mouth daily 90 tablet 1    fluticasone (FLONASE) 50 MCG/ACT nasal spray instill 1 spray into each nostril once daily 16 g 5    famotidine (PEPCID) 20 MG tablet Take 1 tablet by mouth 2 times daily 180 tablet 1    atorvastatin (LIPITOR) 10 MG tablet Take 1 tablet by mouth daily 90 tablet 1    cetirizine (RA ALLERGY RELIEF) 10 MG tablet Take 1 tablet by mouth daily 90 tablet 1    levothyroxine (SYNTHROID) 88 MCG tablet Take 1 tablet by mouth Daily 90 tablet 1    tiZANidine (ZANAFLEX) 2 MG tablet Take 1 tablet by mouth 3 times daily as needed (back pain) 30 tablet 0    varenicline (CHANTIX STARTING MONTH PAK) 0.5 MG X 11 & 1 MG X 42 tablet Take by mouth as directed by package. 42 tablet 0    nicotine polacrilex (NICORETTE) 2 MG lozenge Take 2 lozenges by mouth every hour as needed for Smoking cessation 200 lozenge 3    SM ASPIRIN ADULT LOW STRENGTH 81 MG EC tablet take 1 tablet by mouth once daily 90 tablet 1    naproxen (NAPROSYN) 500 MG tablet take 1 tablet by mouth twice a day with meals 180 tablet 1    nitroGLYCERIN (NITROSTAT) 0.4 MG SL tablet Place 1 tablet under the tongue as needed for Chest pain 25 tablet 3     No current facility-administered medications for this visit.       No Known Allergies    Health Maintenance   Topic Date Due    DTaP/Tdap/Td vaccine (1 - Tdap) 10/23/1981    Shingles Vaccine (1 of 2) 10/23/2012    Colon cancer screen colonoscopy  10/23/2012    Lipid screen  12/04/2020    TSH testing  12/04/2020    Flu vaccine (1) 09/29/2021 (Originally 9/1/2020)    Pneumococcal 0-64 years Vaccine (1 of 1 - PPSV23) 09/29/2021 (Originally 10/23/1966)    Cervical cancer screen  07/13/2022    Breast cancer screen  11/10/2022    Diabetes screen  12/04/2022    Hepatitis C screen  Completed    HIV screen  Completed    Hepatitis A vaccine  Aged Out    Hepatitis B vaccine  Aged Out    Hib vaccine  Aged Out    Meningococcal (ACWY) vaccine  Aged Out       :      Review of Systems   Constitutional: Negative for chills and fever. HENT: Positive for congestion, ear pain (popping), rhinorrhea, sinus pressure and sore throat. Respiratory: Positive for cough. Negative for shortness of breath. Gastrointestinal: Positive for diarrhea. Negative for abdominal pain, nausea and vomiting. Musculoskeletal: Negative for myalgias. Skin: Negative for pallor and rash. Neurological: Negative for headaches. Hematological: Negative for adenopathy. Objective:   Patient was evaluated [place] today during this pandemic covid 19 situation. Physical Exam  Vitals signs and nursing note reviewed. Constitutional:       Appearance: Normal appearance. HENT:      Head: Normocephalic and atraumatic. Right Ear: Hearing, ear canal and external ear normal. A middle ear effusion is present. Left Ear: Hearing, ear canal and external ear normal. A middle ear effusion is present. Nose: Mucosal edema and congestion present. Mouth/Throat:      Lips: Pink. Mouth: Mucous membranes are moist.      Pharynx: Posterior oropharyngeal erythema present. No pharyngeal swelling. Tonsils: No tonsillar exudate. Eyes:      Extraocular Movements: Extraocular movements intact. Conjunctiva/sclera: Conjunctivae normal.   Neck:      Musculoskeletal: Normal range of motion. Cardiovascular:      Rate and Rhythm: Normal rate and regular rhythm. Heart sounds: Normal heart sounds. Pulmonary:      Effort: Pulmonary effort is normal.      Breath sounds: Normal breath sounds. Skin:     General: Skin is warm and dry. Neurological:      Mental Status: She is alert and oriented to person, place, and time. Mental status is at baseline. Psychiatric:         Mood and Affect: Mood normal.         Behavior: Behavior normal.         Thought Content:  Thought content normal. Judgment: Judgment normal.       Pulse 82   Temp 97 °F (36.1 °C) (Infrared)   Ht 5' (1.524 m)   Wt 135 lb (61.2 kg)   SpO2 96%   BMI 26.37 kg/m²     Assessment:       Diagnosis Orders   1. Suspected COVID-19 virus infection  COVID-19 Ambulatory   2. Acute non-recurrent sinusitis, unspecified location         Plan:   Take antibiotic as prescribed for suspected sinus infection  If symptoms worsen or do not improve please follow-up with PCP or return to clinic     You were tested for COVID today. We will call you with results when they are available. If you have not heard from us in 7 days, please call our office. Advance Care Planning  People with COVID-19 may have no symptoms, mild symptoms, such as fever, cough, and shortness of breath or they may have more severe illness, developing severe and fatal pneumonia. As a result, Advance Care Planning with attention to naming a health care decision maker (someone you trust to make healthcare decisions for you if you could not speak for yourself) and sharing other health care preferences is important BEFORE a possible health crisis. Please contact your Primary Care Provider to discuss Advance Care Planning. Preventing the Spread of Coronavirus Disease 2019 in Homes and Residential Communities  For the most recent information go to [email or web address]    Prevention steps for People with confirmed or suspected COVID-19 (including persons under investigation) who do not need to be hospitalized  and   People with confirmed COVID-19 who were hospitalized and determined to be medically stable to go home    Your healthcare provider and public health staff will evaluate whether you can be cared for at home. If it is determined that you do not need to be hospitalized and can be isolated at home, you will be monitored by staff from your local or state health department.  You should follow the prevention steps below until a healthcare provider or local or state health department says you can return to your normal activities. Stay home except to get medical care  People who are mildly ill with COVID-19 are able to isolate at home during their illness. You should restrict activities outside your home, except for getting medical care. Do not go to work, school, or public areas. Avoid using public transportation, ride-sharing, or taxis. Separate yourself from other people and animals in your home  People: As much as possible, you should stay in a specific room and away from other people in your home. Also, you should use a separate bathroom, if available. Animals: You should restrict contact with pets and other animals while you are sick with COVID-19, just like you would around other people. Although there have not been reports of pets or other animals becoming sick with COVID-19, it is still recommended that people sick with COVID-19 limit contact with animals until more information is known about the virus. When possible, have another member of your household care for your animals while you are sick. If you are sick with COVID-19, avoid contact with your pet, including petting, snuggling, being kissed or licked, and sharing food. If you must care for your pet or be around animals while you are sick, wash your hands before and after you interact with pets and wear a facemask. Call ahead before visiting your doctor  If you have a medical appointment, call the healthcare provider and tell them that you have or may have COVID-19. This will help the healthcare providers office take steps to keep other people from getting infected or exposed. Wear a facemask  You should wear a facemask when you are around other people (e.g., sharing a room or vehicle) or pets and before you enter a healthcare providers office.  If you are not able to wear a facemask (for example, because it causes trouble breathing), then people who live with you should not stay in the same room with you, or they should wear a facemask if they enter your room. Cover your coughs and sneezes  Cover your mouth and nose with a tissue when you cough or sneeze. Throw used tissues in a lined trash can. Immediately wash your hands with soap and water for at least 20 seconds or, if soap and water are not available, clean your hands with an alcohol-based hand  that contains at least 60% alcohol. Clean your hands often  Wash your hands often with soap and water for at least 20 seconds, especially after blowing your nose, coughing, or sneezing; going to the bathroom; and before eating or preparing food. If soap and water are not readily available, use an alcohol-based hand  with at least 60% alcohol, covering all surfaces of your hands and rubbing them together until they feel dry. Soap and water are the best option if hands are visibly dirty. Avoid touching your eyes, nose, and mouth with unwashed hands. Avoid sharing personal household items  You should not share dishes, drinking glasses, cups, eating utensils, towels, or bedding with other people or pets in your home. After using these items, they should be washed thoroughly with soap and water. Clean all high-touch surfaces everyday  High touch surfaces include counters, tabletops, doorknobs, bathroom fixtures, toilets, phones, keyboards, tablets, and bedside tables. Also, clean any surfaces that may have blood, stool, or body fluids on them. Use a household cleaning spray or wipe, according to the label instructions. Labels contain instructions for safe and effective use of the cleaning product including precautions you should take when applying the product, such as wearing gloves and making sure you have good ventilation during use of the product. Monitor your symptoms  Seek prompt medical attention if your illness is worsening (e.g., difficulty breathing).  Before seeking care, call your healthcare provider and tell them that you have, or are being evaluated for, COVID-19. Put on a facemask before you enter the facility. These steps will help the healthcare providers office to keep other people in the office or waiting room from getting infected or exposed. Ask your healthcare provider to call the local or state health department. Persons who are placed under active monitoring or facilitated self-monitoring should follow instructions provided by their local health department or occupational health professionals, as appropriate. When working with your local health department check their available hours. If you have a medical emergency and need to call 911, notify the dispatch personnel that you have, or are being evaluated for COVID-19. If possible, put on a facemask before emergency medical services arrive. Discontinuing home isolation  Patients with confirmed COVID-19 should remain under home isolation precautions until the risk of secondary transmission to others is thought to be low. The decision to discontinue home isolation precautions should be made on a case-by-case basis, in consultation with healthcare providers and [facility] and [facility] health departments. Orders Placed This Encounter   Procedures    COVID-19 Ambulatory     Oropharyngeal     Standing Status:   Future     Standing Expiration Date:   11/23/2021     Scheduling Instructions:      Saline media preferred given current shortage of viral transport media but both acceptable     Order Specific Question:   Is this test for diagnosis or screening? Answer:   Diagnosis of ill patient     Order Specific Question:   Symptomatic for COVID-19 as defined by CDC? Answer:   Yes     Order Specific Question:   Date of Symptom Onset     Answer:   11/16/2020     Order Specific Question:   Hospitalized for COVID-19? Answer:   No     Order Specific Question:   Admitted to ICU for COVID-19?      Answer:   No     Order Specific Question:   Employed in healthcare setting? Answer:   Unknown     Order Specific Question:   Resident in a congregate (group) care setting? Answer:   Unknown     Order Specific Question:   Pregnant? Answer:   No     Order Specific Question:   Previously tested for COVID-19? Answer:   Unknown     Orders Placed This Encounter   Medications    amoxicillin-clavulanate (AUGMENTIN) 875-125 MG per tablet     Sig: Take 1 tablet by mouth 2 times daily for 7 days     Dispense:  14 tablet     Refill:  0      Patient given educational materials - see patient instructions. Discussed use, benefit, and side effects of prescribed medications. All patient questions answered. Pt voiced understanding. Patient agreed with treatment plan. Follow up as directed.      Electronicallysigned by Bridgette Medina MD on 11/23/2020 at 3:03 PM

## 2020-11-27 LAB — SARS-COV-2, NAA: DETECTED

## 2020-11-28 ENCOUNTER — TELEPHONE (OUTPATIENT)
Dept: PRIMARY CARE CLINIC | Age: 60
End: 2020-11-28

## 2020-11-28 NOTE — TELEPHONE ENCOUNTER
Spoke with patient regarding results, patient verbalized understanding. No further questions at this time.

## 2021-01-04 ENCOUNTER — HOSPITAL ENCOUNTER (OUTPATIENT)
Age: 61
Setting detail: SPECIMEN
Discharge: HOME | End: 2021-01-04
Payer: MEDICARE

## 2021-01-04 DIAGNOSIS — E78.00 HYPERCHOLESTEREMIA: ICD-10-CM

## 2021-01-04 DIAGNOSIS — E03.9 HYPOTHYROIDISM, UNSPECIFIED TYPE: Chronic | ICD-10-CM

## 2021-01-04 DIAGNOSIS — Z13.0 SCREENING, ANEMIA, DEFICIENCY, IRON: ICD-10-CM

## 2021-01-04 LAB
ABSOLUTE EOS #: 0.08 K/UL (ref 0–0.44)
ABSOLUTE IMMATURE GRANULOCYTE: <0.03 K/UL (ref 0–0.3)
ABSOLUTE LYMPH #: 1.67 K/UL (ref 1.1–3.7)
ABSOLUTE MONO #: 0.59 K/UL (ref 0.1–1.2)
ALBUMIN SERPL-MCNC: 4.4 G/DL (ref 3.5–5.2)
ALBUMIN/GLOBULIN RATIO: 1.5 (ref 1–2.5)
ALP BLD-CCNC: 63 U/L (ref 35–104)
ALT SERPL-CCNC: 17 U/L (ref 5–33)
ANION GAP SERPL CALCULATED.3IONS-SCNC: 12 MMOL/L (ref 9–17)
AST SERPL-CCNC: 25 U/L
BASOPHILS # BLD: 1 % (ref 0–2)
BASOPHILS ABSOLUTE: 0.07 K/UL (ref 0–0.2)
BILIRUB SERPL-MCNC: 0.77 MG/DL (ref 0.3–1.2)
BUN BLDV-MCNC: 10 MG/DL (ref 6–20)
BUN/CREAT BLD: NORMAL (ref 9–20)
CALCIUM SERPL-MCNC: 9.2 MG/DL (ref 8.6–10.4)
CHLORIDE BLD-SCNC: 102 MMOL/L (ref 98–107)
CHOLESTEROL, FASTING: 213 MG/DL
CHOLESTEROL/HDL RATIO: 3.4
CO2: 23 MMOL/L (ref 20–31)
CREAT SERPL-MCNC: 0.54 MG/DL (ref 0.5–0.9)
DIFFERENTIAL TYPE: ABNORMAL
EOSINOPHILS RELATIVE PERCENT: 1 % (ref 1–4)
GFR AFRICAN AMERICAN: >60 ML/MIN
GFR NON-AFRICAN AMERICAN: >60 ML/MIN
GFR SERPL CREATININE-BSD FRML MDRD: NORMAL ML/MIN/{1.73_M2}
GFR SERPL CREATININE-BSD FRML MDRD: NORMAL ML/MIN/{1.73_M2}
GLUCOSE BLD-MCNC: 99 MG/DL (ref 70–99)
HCT VFR BLD CALC: 46.5 % (ref 36.3–47.1)
HDLC SERPL-MCNC: 62 MG/DL
HEMOGLOBIN: 15.2 G/DL (ref 11.9–15.1)
IMMATURE GRANULOCYTES: 0 %
LDL CHOLESTEROL: 102 MG/DL (ref 0–130)
LYMPHOCYTES # BLD: 21 % (ref 24–43)
MCH RBC QN AUTO: 32.2 PG (ref 25.2–33.5)
MCHC RBC AUTO-ENTMCNC: 32.7 G/DL (ref 28.4–34.8)
MCV RBC AUTO: 98.5 FL (ref 82.6–102.9)
MONOCYTES # BLD: 7 % (ref 3–12)
NRBC AUTOMATED: 0 PER 100 WBC
PDW BLD-RTO: 13.9 % (ref 11.8–14.4)
PLATELET # BLD: 251 K/UL (ref 138–453)
PLATELET ESTIMATE: ABNORMAL
PMV BLD AUTO: 10.1 FL (ref 8.1–13.5)
POTASSIUM SERPL-SCNC: 4.8 MMOL/L (ref 3.7–5.3)
RBC # BLD: 4.72 M/UL (ref 3.95–5.11)
RBC # BLD: ABNORMAL 10*6/UL
SEG NEUTROPHILS: 70 % (ref 36–65)
SEGMENTED NEUTROPHILS ABSOLUTE COUNT: 5.66 K/UL (ref 1.5–8.1)
SODIUM BLD-SCNC: 137 MMOL/L (ref 135–144)
TOTAL PROTEIN: 7.4 G/DL (ref 6.4–8.3)
TRIGLYCERIDE, FASTING: 247 MG/DL
TSH SERPL DL<=0.05 MIU/L-ACNC: 1.48 MIU/L (ref 0.3–5)
VLDLC SERPL CALC-MCNC: ABNORMAL MG/DL (ref 1–30)
WBC # BLD: 8.1 K/UL (ref 3.5–11.3)
WBC # BLD: ABNORMAL 10*3/UL

## 2021-01-05 DIAGNOSIS — E78.2 ELEVATED CHOLESTEROL WITH ELEVATED TRIGLYCERIDES: Primary | ICD-10-CM

## 2021-01-06 LAB
ESTIMATED AVERAGE GLUCOSE: 114 MG/DL
HBA1C MFR BLD: 5.6 % (ref 4–6)

## 2021-02-03 DIAGNOSIS — R07.89 OTHER CHEST PAIN: ICD-10-CM

## 2021-02-04 NOTE — TELEPHONE ENCOUNTER
Last visit: 09/29/2020  Last Med refill: 09/29/2020  Does patient have enough medication for 72 hours: No:     Next Visit Date:  Future Appointments   Date Time Provider Coretta Jonesi   2/18/2021  1:00 PM Amina Feliciano MD [id number]   3/29/2021  1:15 PM Nicole Foster  [address] Maintenance   Topic Date Due    DTaP/Tdap/Td vaccine (1 - Tdap) 10/23/1981    Shingles Vaccine (1 of 2) 10/23/2012    Colon cancer screen colonoscopy  10/23/2012    Flu vaccine (1) 09/29/2021 (Originally 9/1/2020)    Pneumococcal 0-64 years Vaccine (1 of 1 - PPSV23) 09/29/2021 (Originally 10/23/1968)    Lipid screen  01/04/2022    TSH testing  01/04/2022    Cervical cancer screen  07/13/2022    Breast cancer screen  11/10/2022    Hepatitis C screen  Completed    HIV screen  Completed    Hepatitis A vaccine  Aged Out    Hepatitis B vaccine  Aged Out    Hib vaccine  Aged Out    Meningococcal (ACWY) vaccine  Aged Out       Hemoglobin A1C (%)   Date Value   01/04/2021 5.6   12/04/2019 5.6   12/10/2012 5.6             ( goal A1C is < 7)   No results found for: LABMICR  LDL Cholesterol (mg/dL)   Date Value   01/04/2021 102   12/04/2019 134 (H)       (goal LDL is <100)   AST (U/L)   Date Value   01/04/2021 25     ALT (U/L)   Date Value   01/04/2021 17     BUN (mg/dL)   Date Value   01/04/2021 10     BP Readings from Last 3 Encounters:   09/29/20 118/78   06/26/20 106/69   12/04/19 120/83          (goal 120/80)    All Future Testing planned in [facility]  Lab Frequency Next Occurrence   Cologuard (For External Results Only) Once 02/08/2021   Hemoglobin A1C Once 02/13/2021               Patient Active Problem List:     Hypothyroidism     Hypercholesteremia     Allergic conjunctivitis of both eyes     Generalized pain     Other chest pain     Gastritis     Tobacco abuse

## 2021-02-05 RX ORDER — AMLODIPINE BESYLATE 2.5 MG/1
TABLET ORAL
Qty: 90 TABLET | Refills: 1 | Status: SHIPPED | OUTPATIENT
Start: 2021-02-05 | End: 2021-07-19

## 2021-02-08 DIAGNOSIS — R07.89 OTHER CHEST PAIN: ICD-10-CM

## 2021-02-09 RX ORDER — AMLODIPINE BESYLATE 2.5 MG/1
TABLET ORAL
Qty: 90 TABLET | Refills: 1 | OUTPATIENT
Start: 2021-02-09

## 2021-02-20 DIAGNOSIS — R07.89 OTHER CHEST PAIN: ICD-10-CM

## 2021-02-22 RX ORDER — ASPIRIN 81 MG/1
TABLET, DELAYED RELEASE ORAL
Qty: 90 TABLET | Refills: 1 | Status: SHIPPED | OUTPATIENT
Start: 2021-02-22 | End: 2021-08-16 | Stop reason: SDUPTHER

## 2021-03-10 DIAGNOSIS — R07.89 OTHER CHEST PAIN: ICD-10-CM

## 2021-03-10 RX ORDER — AMLODIPINE BESYLATE 2.5 MG/1
TABLET ORAL
Qty: 90 TABLET | Refills: 1 | OUTPATIENT
Start: 2021-03-10

## 2021-03-16 DIAGNOSIS — R52 GENERALIZED PAIN: ICD-10-CM

## 2021-03-16 RX ORDER — NAPROXEN 500 MG/1
TABLET ORAL
Qty: 180 TABLET | Refills: 0 | Status: SHIPPED | OUTPATIENT
Start: 2021-03-16 | End: 2021-08-16 | Stop reason: SDUPTHER

## 2021-03-25 DIAGNOSIS — H10.13 ALLERGIC CONJUNCTIVITIS OF BOTH EYES: ICD-10-CM

## 2021-03-29 RX ORDER — FLUTICASONE PROPIONATE 50 MCG
SPRAY, SUSPENSION (ML) NASAL
Qty: 16 G | Refills: 5 | Status: SHIPPED | OUTPATIENT
Start: 2021-03-29 | End: 2021-12-06

## 2021-04-09 ENCOUNTER — HOSPITAL ENCOUNTER (OUTPATIENT)
Age: 61
Setting detail: SPECIMEN
Discharge: HOME | End: 2021-04-09
Payer: MEDICARE

## 2021-04-09 DIAGNOSIS — E78.2 ELEVATED CHOLESTEROL WITH ELEVATED TRIGLYCERIDES: ICD-10-CM

## 2021-04-09 LAB
ESTIMATED AVERAGE GLUCOSE: 111 MG/DL
HBA1C MFR BLD: 5.5 % (ref 4–6)

## 2021-04-26 ENCOUNTER — OFFICE VISIT (OUTPATIENT)
Dept: FAMILY MEDICINE CLINIC | Age: 61
End: 2021-04-26
Payer: MEDICARE

## 2021-04-26 VITALS
WEIGHT: 153 LBS | OXYGEN SATURATION: 96 % | BODY MASS INDEX: 29.88 KG/M2 | TEMPERATURE: 97.5 F | SYSTOLIC BLOOD PRESSURE: 104 MMHG | HEART RATE: 77 BPM | DIASTOLIC BLOOD PRESSURE: 76 MMHG

## 2021-04-26 DIAGNOSIS — Z72.0 TOBACCO ABUSE: ICD-10-CM

## 2021-04-26 DIAGNOSIS — E66.3 OVERWEIGHT (BMI 25.0-29.9): ICD-10-CM

## 2021-04-26 DIAGNOSIS — M54.50 CHRONIC RIGHT-SIDED LOW BACK PAIN WITHOUT SCIATICA: ICD-10-CM

## 2021-04-26 DIAGNOSIS — E03.9 HYPOTHYROIDISM, UNSPECIFIED TYPE: Chronic | ICD-10-CM

## 2021-04-26 DIAGNOSIS — R12 HEARTBURN: ICD-10-CM

## 2021-04-26 DIAGNOSIS — H10.13 ALLERGIC CONJUNCTIVITIS OF BOTH EYES: ICD-10-CM

## 2021-04-26 DIAGNOSIS — E78.00 HYPERCHOLESTEREMIA: ICD-10-CM

## 2021-04-26 DIAGNOSIS — G89.29 CHRONIC RIGHT-SIDED LOW BACK PAIN WITHOUT SCIATICA: ICD-10-CM

## 2021-04-26 DIAGNOSIS — R73.01 ELEVATED FASTING BLOOD SUGAR: Primary | ICD-10-CM

## 2021-04-26 PROCEDURE — 3017F COLORECTAL CA SCREEN DOC REV: CPT | Performed by: INTERNAL MEDICINE

## 2021-04-26 PROCEDURE — G8427 DOCREV CUR MEDS BY ELIG CLIN: HCPCS | Performed by: INTERNAL MEDICINE

## 2021-04-26 PROCEDURE — 99214 OFFICE O/P EST MOD 30 MIN: CPT | Performed by: INTERNAL MEDICINE

## 2021-04-26 PROCEDURE — 4004F PT TOBACCO SCREEN RCVD TLK: CPT | Performed by: INTERNAL MEDICINE

## 2021-04-26 PROCEDURE — G8417 CALC BMI ABV UP PARAM F/U: HCPCS | Performed by: INTERNAL MEDICINE

## 2021-04-26 RX ORDER — ATORVASTATIN CALCIUM 10 MG/1
10 TABLET, FILM COATED ORAL DAILY
Qty: 90 TABLET | Refills: 1 | Status: SHIPPED | OUTPATIENT
Start: 2021-04-26 | End: 2021-12-06

## 2021-04-26 RX ORDER — VARENICLINE TARTRATE 1 MG/1
1 TABLET, FILM COATED ORAL 2 TIMES DAILY
Qty: 60 TABLET | Refills: 5 | Status: SHIPPED | OUTPATIENT
Start: 2021-04-26 | End: 2021-08-16 | Stop reason: SINTOL

## 2021-04-26 RX ORDER — CETIRIZINE HYDROCHLORIDE 10 MG/1
10 TABLET ORAL DAILY
Qty: 90 TABLET | Refills: 1 | Status: SHIPPED | OUTPATIENT
Start: 2021-04-26 | End: 2022-01-07

## 2021-04-26 RX ORDER — FAMOTIDINE 20 MG/1
20 TABLET, FILM COATED ORAL 2 TIMES DAILY
Qty: 180 TABLET | Refills: 1 | Status: SHIPPED | OUTPATIENT
Start: 2021-04-26 | End: 2021-10-29

## 2021-04-26 RX ORDER — LEVOTHYROXINE SODIUM 88 UG/1
88 TABLET ORAL DAILY
Qty: 90 TABLET | Refills: 1 | Status: SHIPPED | OUTPATIENT
Start: 2021-04-26 | End: 2021-10-29

## 2021-04-26 RX ORDER — TIZANIDINE 2 MG/1
2 TABLET ORAL 3 TIMES DAILY PRN
Qty: 30 TABLET | Refills: 0 | Status: SHIPPED | OUTPATIENT
Start: 2021-04-26 | End: 2021-06-16 | Stop reason: SDUPTHER

## 2021-04-26 ASSESSMENT — ENCOUNTER SYMPTOMS
ANAL BLEEDING: 0
SHORTNESS OF BREATH: 0
COUGH: 0
CHEST TIGHTNESS: 0
CONSTIPATION: 0
VOMITING: 0
DIARRHEA: 0
CHOKING: 0
BLOOD IN STOOL: 0
ABDOMINAL PAIN: 0
NAUSEA: 0
WHEEZING: 0

## 2021-04-26 ASSESSMENT — PATIENT HEALTH QUESTIONNAIRE - PHQ9
SUM OF ALL RESPONSES TO PHQ QUESTIONS 1-9: 0
2. FEELING DOWN, DEPRESSED OR HOPELESS: 0
1. LITTLE INTEREST OR PLEASURE IN DOING THINGS: 0

## 2021-04-26 ASSESSMENT — VISUAL ACUITY: OU: 1

## 2021-04-26 NOTE — PROGRESS NOTES
Subjective:       Patient ID:     Navya Carrero is a 64 y.o. female who presents for   Chief Complaint   Patient presents with    Hyperlipidemia       HPI:  Nursing note reviewed and discussed with patient. Hypertension-tolerating current regimen without chest pain, palpitations, dizziness, peripheral edema, dyspnea on exertion, orthopnea, paroxysmal nocturnal dyspnea. Hyperlipidemia-tolerating current regimen without myalgias, dyspepsia, jaundice. Mostly compliant with diet recommendations for low salt diet, tries to limit greasy/cheesy/fried foods, not very compliant with exercise recommendations. Cardiovascular risk factors: dyslipidemia, hypertension, sedentary lifestyle and smoking/ tobacco exposure        Patient's medications, allergies, past medical, surgical, social and family histories were reviewed and updated as appropriate. Past Medical History:   Diagnosis Date    Hyperlipidemia     Hypothyroidism     Subdural hematoma ([facility] [address] 2016     Past Surgical History:   Procedure Laterality Date    CERVICAL SPINE SURGERY      HYSTERECTOMY      partial       Social History     Tobacco Use    Smoking status: Current Every Day Smoker     Packs/day: 0.50     Years: 34.00     Pack years: 17.00     Types: Cigarettes     Last attempt to quit: 2011     Years since quittin.8    Smokeless tobacco: Never Used   Substance Use Topics    Alcohol use: Yes     Alcohol/week: 2.0 standard drinks     Types: 2 Cans of beer per week      Patient Active Problem List   Diagnosis    Hypothyroidism    Hypercholesteremia    Allergic conjunctivitis of both eyes    Generalized pain    Other chest pain    Gastritis    Tobacco abuse         Prior to Visit Medications    Medication Sig Taking?  Authorizing Provider   fluticasone (FLONASE) 50 MCG/ACT nasal spray instill 1 spray into each nostril once daily Yes Nicole Thacker MD   naproxen (NAPROSYN) 500 MG tablet take 1 tablet by mouth twice a day with meals Yes Nicole Rodriguez MD    ASPIRIN ADULT LOW STRENGTH 81 MG EC tablet take 1 tablet by mouth once daily Yes Torrey Jones MD   amLODIPine (NORVASC) 2.5 MG tablet take 1 tablet by mouth once daily Yes Torrey Jones MD   famotidine (PEPCID) 20 MG tablet Take 1 tablet by mouth 2 times daily Yes Torrey Jones MD   atorvastatin (LIPITOR) 10 MG tablet Take 1 tablet by mouth daily Yes Nicole Rodriguez MD   cetirizine (RA ALLERGY RELIEF) 10 MG tablet Take 1 tablet by mouth daily Yes Torrey Jones MD   levothyroxine (SYNTHROID) 88 MCG tablet Take 1 tablet by mouth Daily Yes Nicole Rodriguez MD   tiZANidine (ZANAFLEX) 2 MG tablet Take 1 tablet by mouth 3 times daily as needed (back pain) Yes Nicole Rodriguez MD   nitroGLYCERIN (NITROSTAT) 0.4 MG SL tablet Place 1 tablet under the tongue as needed for Chest pain  Patient not taking: Reported on 4/26/2021  Nicole Rodriguez MD     Review of Systems  Review of Systems   Constitutional: Negative for fatigue, fever and unexpected weight change. Respiratory: Negative for cough, choking, chest tightness, shortness of breath and wheezing. Cardiovascular: Negative for chest pain, palpitations and leg swelling. Gastrointestinal: Negative for abdominal pain, anal bleeding, blood in stool, constipation, diarrhea, nausea and vomiting. Endocrine: Negative. Musculoskeletal: Negative for joint swelling and myalgias. Skin: Negative. Neurological: Negative for dizziness. Psychiatric/Behavioral: Negative for sleep disturbance. All other systems reviewed and are negative.          Objective:       Physical Exam:  /76 (Site: Left Upper Arm, Position: Sitting, Cuff Size: Medium Adult)   Pulse 77   Temp 97.5 °F (36.4 °C) (Temporal)   Wt 153 lb (69.4 kg)   SpO2 96%   BMI 29.88 kg/m²    Wt Readings from Last 3 Encounters:   04/26/21 153 lb (69.4 kg)   11/23/20 135 lb (61.2 kg)   09/29/20 149 lb 12.8 oz (67.9 kg)       Physical Exam  Vitals signs and nursing note reviewed. Constitutional:       General: She is not in acute distress. Appearance: She is well-developed. She is not ill-appearing. Eyes:      General: Lids are normal. Vision grossly intact. Cardiovascular:      Rate and Rhythm: Normal rate and regular rhythm. Heart sounds: Normal heart sounds, S1 normal and S2 normal. No murmur. No friction rub. No gallop. Pulmonary:      Effort: Pulmonary effort is normal. No respiratory distress. Breath sounds: Normal breath sounds. No wheezing. Abdominal:      General: Bowel sounds are normal.      Palpations: Abdomen is soft. There is no mass. Tenderness: There is no abdominal tenderness. There is no guarding. Musculoskeletal: Normal range of motion. Skin:     General: Skin is warm and dry. Capillary Refill: Capillary refill takes less than 2 seconds. Neurological:      General: No focal deficit present. Mental Status: She is alert and oriented to person, place, and time.          Data Review  Hospital Outpatient Visit on 04/09/2021   Component Date Value    Hemoglobin A1C 04/09/2021 5.5     Estimated Avg Glucose 04/09/2021 111    Hospital Outpatient Visit on 01/04/2021   Component Date Value    TSH 01/04/2021 1.48     WBC 01/04/2021 8.1     RBC 01/04/2021 4.72     Hemoglobin 01/04/2021 15.2*    Hematocrit 01/04/2021 46.5     MCV 01/04/2021 98.5     MCH 01/04/2021 32.2     MCHC 01/04/2021 32.7     RDW 01/04/2021 13.9     Platelets 24/70/6967 251     MPV 01/04/2021 10.1     NRBC Automated 01/04/2021 0.0     Differential Type 01/04/2021 NOT REPORTED     Seg Neutrophils 01/04/2021 70*    Lymphocytes 01/04/2021 21*    Monocytes 01/04/2021 7     Eosinophils % 01/04/2021 1     Basophils 01/04/2021 1     Immature Granulocytes 01/04/2021 0     Segs Absolute 01/04/2021 5.66     Absolute Lymph # 01/04/2021 1.67     Absolute Mono # 01/04/2021 0.59     Absolute Eos # 01/04/2021 0.08     Basophils Absolute 01/04/2021 0.07     Absolute Immature Granul* 01/04/2021 <0.03     WBC Morphology 01/04/2021 NOT REPORTED     RBC Morphology 01/04/2021 NOT REPORTED     Platelet Estimate 42/69/4973 NOT REPORTED     Glucose 01/04/2021 99     BUN 01/04/2021 10     CREATININE 01/04/2021 0.54     Bun/Cre Ratio 01/04/2021 NOT REPORTED     Calcium 01/04/2021 9.2     Sodium 01/04/2021 137     Potassium 01/04/2021 4.8     Chloride 01/04/2021 102     CO2 01/04/2021 23     Anion Gap 01/04/2021 12     Alkaline Phosphatase 01/04/2021 63     ALT 01/04/2021 17     AST 01/04/2021 25     Total Bilirubin 01/04/2021 0.77     Total Protein 01/04/2021 7.4     Albumin 01/04/2021 4.4     Albumin/Globulin Ratio 01/04/2021 1.5     GFR Non- 01/04/2021 >60     GFR  01/04/2021 >60     GFR Comment 01/04/2021          GFR Staging 01/04/2021 NOT REPORTED     Cholesterol, Fasting 01/04/2021 213*    HDL 01/04/2021 62     LDL Cholesterol 01/04/2021 102     Chol/HDL Ratio 01/04/2021 3.4     Triglyceride, Fasting 01/04/2021 247*    VLDL 01/04/2021 NOT REPORTED*    Hemoglobin A1C 01/04/2021 5.6     Estimated Avg Glucose 01/04/2021 114    Hospital Outpatient Visit on 11/23/2020   Component Date Value    SARS-CoV-2, JESSICA 11/23/2020 Detected*          Assessment/Plan:      1. Elevated fasting blood sugar  Provided counseling re: consistent bedtimes, consistent exercise 20-30 minutes per day (goal 150 min/week of moderate intensity exercise like walking) and consistent eating habits that include vegetables, fruit and whole grain foods along with healthy fats and protein  Avoid processed and sugary foods, large amounts of alcoholic drinks     2. Chronic right-sided low back pain without sciatica  - tiZANidine (ZANAFLEX) 2 MG tablet; Take 1 tablet by mouth 3 times daily as needed (back pain)  Dispense: 30 tablet; Refill: 0    3.  Hypothyroidism, unspecified type  - levothyroxine (SYNTHROID) 88 MCG tablet; Take 1 tablet by mouth Daily  Dispense: 90 tablet; Refill: 1    4. Allergic conjunctivitis of both eyes  - cetirizine (ZYRTEC) 10 MG tablet; Take 1 tablet by mouth daily  Dispense: 90 tablet; Refill: 1    5. Hypercholesteremia  - atorvastatin (LIPITOR) 10 MG tablet; Take 1 tablet by mouth daily  Dispense: 90 tablet; Refill: 1    6. Heartburn  - famotidine (PEPCID) 20 MG tablet; Take 1 tablet by mouth 2 times daily  Dispense: 180 tablet; Refill: 1    7. Tobacco abuse  - varenicline (CHANTIX) 1 MG tablet; Take 1 tablet by mouth 2 times daily  Dispense: 60 tablet; Refill: 5    8. Overweight (BMI 25.0-29. 9)  Provided counseling re: consistent bedtimes, consistent exercise 20-30 minutes per day (goal 150 min/week of moderate intensity exercise like walking) and consistent eating habits that include vegetables, fruit and whole grain foods along with healthy fats and protein  Avoid processed and sugary foods, large amounts of alcoholic drinks                Health Maintenance Due   Topic Date Due    DTaP/Tdap/Td vaccine (1 - Tdap) Never done    Shingles Vaccine (1 of 2) Never done    Colon cancer screen colonoscopy  Never done       Electronically signed by Alexa Oakes MD on 4/26/2021 at 2:21 PM

## 2021-06-16 ENCOUNTER — OFFICE VISIT (OUTPATIENT)
Dept: FAMILY MEDICINE CLINIC | Age: 61
End: 2021-06-16
Payer: MEDICARE

## 2021-06-16 VITALS
DIASTOLIC BLOOD PRESSURE: 68 MMHG | WEIGHT: 152.8 LBS | HEART RATE: 72 BPM | TEMPERATURE: 97.9 F | OXYGEN SATURATION: 97 % | BODY MASS INDEX: 28.12 KG/M2 | HEIGHT: 62 IN | SYSTOLIC BLOOD PRESSURE: 108 MMHG

## 2021-06-16 DIAGNOSIS — J01.90 ACUTE BACTERIAL SINUSITIS: Primary | ICD-10-CM

## 2021-06-16 DIAGNOSIS — R73.01 ELEVATED FASTING BLOOD SUGAR: ICD-10-CM

## 2021-06-16 DIAGNOSIS — M54.50 CHRONIC RIGHT-SIDED LOW BACK PAIN WITHOUT SCIATICA: ICD-10-CM

## 2021-06-16 DIAGNOSIS — R52 GENERALIZED PAIN: ICD-10-CM

## 2021-06-16 DIAGNOSIS — E78.00 HYPERCHOLESTEREMIA: ICD-10-CM

## 2021-06-16 DIAGNOSIS — E03.9 ACQUIRED HYPOTHYROIDISM: ICD-10-CM

## 2021-06-16 DIAGNOSIS — B96.89 ACUTE BACTERIAL SINUSITIS: Primary | ICD-10-CM

## 2021-06-16 DIAGNOSIS — G89.29 CHRONIC RIGHT-SIDED LOW BACK PAIN WITHOUT SCIATICA: ICD-10-CM

## 2021-06-16 PROCEDURE — 4004F PT TOBACCO SCREEN RCVD TLK: CPT | Performed by: INTERNAL MEDICINE

## 2021-06-16 PROCEDURE — G8417 CALC BMI ABV UP PARAM F/U: HCPCS | Performed by: INTERNAL MEDICINE

## 2021-06-16 PROCEDURE — 3017F COLORECTAL CA SCREEN DOC REV: CPT | Performed by: INTERNAL MEDICINE

## 2021-06-16 PROCEDURE — G8427 DOCREV CUR MEDS BY ELIG CLIN: HCPCS | Performed by: INTERNAL MEDICINE

## 2021-06-16 PROCEDURE — 99214 OFFICE O/P EST MOD 30 MIN: CPT | Performed by: INTERNAL MEDICINE

## 2021-06-16 RX ORDER — TIZANIDINE 2 MG/1
2 TABLET ORAL 3 TIMES DAILY PRN
Qty: 30 TABLET | Refills: 0 | Status: SHIPPED | OUTPATIENT
Start: 2021-06-16 | End: 2021-12-20

## 2021-06-16 RX ORDER — AMOXICILLIN 875 MG/1
875 TABLET, COATED ORAL 2 TIMES DAILY
Qty: 14 TABLET | Refills: 0 | Status: SHIPPED | OUTPATIENT
Start: 2021-06-16 | End: 2021-06-23

## 2021-06-16 SDOH — ECONOMIC STABILITY: FOOD INSECURITY: WITHIN THE PAST 12 MONTHS, THE FOOD YOU BOUGHT JUST DIDN'T LAST AND YOU DIDN'T HAVE MONEY TO GET MORE.: NEVER TRUE

## 2021-06-16 SDOH — ECONOMIC STABILITY: FOOD INSECURITY: WITHIN THE PAST 12 MONTHS, YOU WORRIED THAT YOUR FOOD WOULD RUN OUT BEFORE YOU GOT MONEY TO BUY MORE.: NEVER TRUE

## 2021-06-16 ASSESSMENT — ENCOUNTER SYMPTOMS
COUGH: 0
ABDOMINAL PAIN: 0
WHEEZING: 0
VOMITING: 0
CHEST TIGHTNESS: 0
ANAL BLEEDING: 0
CONSTIPATION: 0
SHORTNESS OF BREATH: 0
BLOOD IN STOOL: 0
CHOKING: 0
NAUSEA: 0
DIARRHEA: 0

## 2021-06-16 ASSESSMENT — VISUAL ACUITY: OU: 1

## 2021-06-16 ASSESSMENT — SOCIAL DETERMINANTS OF HEALTH (SDOH): HOW HARD IS IT FOR YOU TO PAY FOR THE VERY BASICS LIKE FOOD, HOUSING, MEDICAL CARE, AND HEATING?: NOT HARD AT ALL

## 2021-06-16 NOTE — PROGRESS NOTES
Alcohol/week: 2.0 standard drinks     Types: 2 Cans of beer per week      Patient Active Problem List   Diagnosis    Hypothyroidism    Hypercholesteremia    Allergic conjunctivitis of both eyes    Generalized pain    Other chest pain    Gastritis    Tobacco abuse    Overweight (BMI 25.0-29. 9)    Elevated fasting blood sugar    Heartburn    Chronic right-sided low back pain without sciatica         Prior to Visit Medications    Medication Sig Taking? Authorizing Provider   tiZANidine (ZANAFLEX) 2 MG tablet Take 1 tablet by mouth 3 times daily as needed (back pain) Yes Nicole Frias MD   varenicline (CHANTIX) 1 MG tablet Take 1 tablet by mouth 2 times daily Yes Linda Loza MD   levothyroxine (SYNTHROID) 88 MCG tablet Take 1 tablet by mouth Daily Yes Nicole Frias MD   cetirizine (ZYRTEC) 10 MG tablet Take 1 tablet by mouth daily Yes Linda Loza MD   atorvastatin (LIPITOR) 10 MG tablet Take 1 tablet by mouth daily Yes Linda Loza MD   famotidine (PEPCID) 20 MG tablet Take 1 tablet by mouth 2 times daily Yes Linda Loza MD   fluticasone (FLONASE) 50 MCG/ACT nasal spray instill 1 spray into each nostril once daily Yes Linda Loza MD   naproxen (NAPROSYN) 500 MG tablet take 1 tablet by mouth twice a day with meals Yes Nicole Frias MD   SM ASPIRIN ADULT LOW STRENGTH 81 MG EC tablet take 1 tablet by mouth once daily Yes Nicole Frias MD   amLODIPine (NORVASC) 2.5 MG tablet take 1 tablet by mouth once daily Yes Nicole Frias MD   nitroGLYCERIN (NITROSTAT) 0.4 MG SL tablet Place 1 tablet under the tongue as needed for Chest pain  Patient not taking: Reported on 4/26/2021  Nicole Frias MD     Review of Systems  Review of Systems   Constitutional: Negative for fatigue, fever and unexpected weight change. Respiratory: Negative for cough, choking, chest tightness, shortness of breath and wheezing.     Cardiovascular: Negative for chest pain, palpitations and leg swelling. Gastrointestinal: Negative for abdominal pain, anal bleeding, blood in stool, constipation, diarrhea, nausea and vomiting. Endocrine: Negative. Musculoskeletal: Negative for joint swelling and myalgias. Skin: Negative. Neurological: Negative for dizziness. Psychiatric/Behavioral: Negative for sleep disturbance. All other systems reviewed and are negative. Objective:       Physical Exam:  /68 (Site: Left Upper Arm, Position: Sitting, Cuff Size: Medium Adult)   Pulse 72   Temp 97.9 °F (36.6 °C) (Infrared)   Ht 5' 2\" (1.575 m)   Wt 152 lb 12.8 oz (69.3 kg)   SpO2 97%   BMI 27.95 kg/m²   Physical Exam  Vitals and nursing note reviewed. Constitutional:       General: She is not in acute distress. Appearance: She is well-developed. She is not ill-appearing. HENT:      Nose: Mucosal edema and rhinorrhea present. Rhinorrhea is purulent. Right Turbinates: Enlarged. Left Turbinates: Enlarged. Comments: Purulent post nasal drip   Eyes:      General: Lids are normal. Vision grossly intact. Cardiovascular:      Rate and Rhythm: Normal rate and regular rhythm. Heart sounds: Normal heart sounds, S1 normal and S2 normal. No murmur heard. No friction rub. No gallop. Pulmonary:      Effort: Pulmonary effort is normal. No respiratory distress. Breath sounds: Normal breath sounds. No wheezing. Abdominal:      General: Bowel sounds are normal.      Palpations: Abdomen is soft. There is no mass. Tenderness: There is no abdominal tenderness. There is no guarding. Musculoskeletal:         General: Normal range of motion. Skin:     General: Skin is warm and dry. Capillary Refill: Capillary refill takes less than 2 seconds. Neurological:      General: No focal deficit present. Mental Status: She is alert and oriented to person, place, and time. Data Review  No visits with results within 2 Month(s) from this visit.    Latest known visit with results is:   Hospital Outpatient Visit on 04/09/2021   Component Date Value    Hemoglobin A1C 04/09/2021 5.5     Estimated Avg Glucose 04/09/2021 111           Assessment/Plan:      1. Chronic right-sided low back pain without sciatica    - tiZANidine (ZANAFLEX) 2 MG tablet; Take 1 tablet by mouth 3 times daily as needed (back pain)  Dispense: 30 tablet; Refill: 0    2. Acute bacterial sinusitis  - amoxicillin (AMOXIL) 875 MG tablet; Take 1 tablet by mouth 2 times daily for 7 days  Dispense: 14 tablet; Refill: 0    3. Acquired hypothyroidism  Continue synthroid     4. Hypercholesteremia  Continue atorvastatin   - Lipid, Fasting; Future  - Comprehensive Metabolic Panel; Future    5. Generalized pain  conitnue PRN tylenol, tizanidine     6.  Elevated fasting blood sugar  Continue diet and lifestyle management            Health Maintenance Due   Topic Date Due    DTaP/Tdap/Td vaccine (1 - Tdap) Never done    Shingles Vaccine (1 of 2) Never done    Colon cancer screen colonoscopy  Never done       Electronically signed by Adele Early MD on 6/16/2021 at 11:37 AM

## 2021-06-17 DIAGNOSIS — R07.89 OTHER CHEST PAIN: ICD-10-CM

## 2021-06-17 RX ORDER — AMLODIPINE BESYLATE 2.5 MG/1
TABLET ORAL
Qty: 90 TABLET | Refills: 1 | OUTPATIENT
Start: 2021-06-17

## 2021-07-18 DIAGNOSIS — R07.89 OTHER CHEST PAIN: ICD-10-CM

## 2021-07-19 RX ORDER — AMLODIPINE BESYLATE 2.5 MG/1
TABLET ORAL
Qty: 90 TABLET | Refills: 1 | Status: SHIPPED | OUTPATIENT
Start: 2021-07-19 | End: 2021-12-27

## 2021-07-29 ENCOUNTER — TELEPHONE (OUTPATIENT)
Dept: FAMILY MEDICINE CLINIC | Age: 61
End: 2021-07-29

## 2021-07-29 RX ORDER — BUPROPION HYDROCHLORIDE 150 MG/1
150 TABLET, EXTENDED RELEASE ORAL 2 TIMES DAILY
Qty: 60 TABLET | Refills: 3 | Status: SHIPPED | OUTPATIENT
Start: 2021-07-29 | End: 2021-08-16 | Stop reason: SINTOL

## 2021-08-16 ENCOUNTER — HOSPITAL ENCOUNTER (OUTPATIENT)
Age: 61
Setting detail: SPECIMEN
Discharge: HOME | End: 2021-08-16
Payer: MEDICARE

## 2021-08-16 ENCOUNTER — OFFICE VISIT (OUTPATIENT)
Dept: FAMILY MEDICINE CLINIC | Age: 61
End: 2021-08-16
Payer: MEDICARE

## 2021-08-16 VITALS
BODY MASS INDEX: 27.84 KG/M2 | OXYGEN SATURATION: 99 % | SYSTOLIC BLOOD PRESSURE: 110 MMHG | WEIGHT: 152.2 LBS | HEART RATE: 71 BPM | TEMPERATURE: 97.4 F | DIASTOLIC BLOOD PRESSURE: 66 MMHG

## 2021-08-16 DIAGNOSIS — Z72.0 TOBACCO ABUSE: ICD-10-CM

## 2021-08-16 DIAGNOSIS — E78.00 HYPERCHOLESTEREMIA: ICD-10-CM

## 2021-08-16 DIAGNOSIS — R07.89 OTHER CHEST PAIN: Primary | ICD-10-CM

## 2021-08-16 DIAGNOSIS — R52 GENERALIZED PAIN: ICD-10-CM

## 2021-08-16 LAB
ALBUMIN SERPL-MCNC: 4.6 G/DL (ref 3.5–5.2)
ALBUMIN/GLOBULIN RATIO: 1.6 (ref 1–2.5)
ALP BLD-CCNC: 65 U/L (ref 35–104)
ALT SERPL-CCNC: 23 U/L (ref 5–33)
ANION GAP SERPL CALCULATED.3IONS-SCNC: 11 MMOL/L (ref 9–17)
AST SERPL-CCNC: 25 U/L
BILIRUB SERPL-MCNC: 0.85 MG/DL (ref 0.3–1.2)
BUN BLDV-MCNC: 10 MG/DL (ref 6–20)
BUN/CREAT BLD: NORMAL (ref 9–20)
CALCIUM SERPL-MCNC: 9.4 MG/DL (ref 8.6–10.4)
CHLORIDE BLD-SCNC: 104 MMOL/L (ref 98–107)
CHOLESTEROL, FASTING: 245 MG/DL
CHOLESTEROL/HDL RATIO: 4.2
CO2: 24 MMOL/L (ref 20–31)
CREAT SERPL-MCNC: 0.67 MG/DL (ref 0.5–0.9)
GFR AFRICAN AMERICAN: >60 ML/MIN
GFR NON-AFRICAN AMERICAN: >60 ML/MIN
GFR SERPL CREATININE-BSD FRML MDRD: NORMAL ML/MIN/{1.73_M2}
GFR SERPL CREATININE-BSD FRML MDRD: NORMAL ML/MIN/{1.73_M2}
GLUCOSE BLD-MCNC: 89 MG/DL (ref 70–99)
HDLC SERPL-MCNC: 59 MG/DL
LDL CHOLESTEROL: 138 MG/DL (ref 0–130)
POTASSIUM SERPL-SCNC: 4.9 MMOL/L (ref 3.7–5.3)
SODIUM BLD-SCNC: 139 MMOL/L (ref 135–144)
TOTAL PROTEIN: 7.5 G/DL (ref 6.4–8.3)
TRIGLYCERIDE, FASTING: 241 MG/DL
VLDLC SERPL CALC-MCNC: ABNORMAL MG/DL (ref 1–30)

## 2021-08-16 PROCEDURE — 99214 OFFICE O/P EST MOD 30 MIN: CPT | Performed by: INTERNAL MEDICINE

## 2021-08-16 PROCEDURE — G8427 DOCREV CUR MEDS BY ELIG CLIN: HCPCS | Performed by: INTERNAL MEDICINE

## 2021-08-16 PROCEDURE — 4004F PT TOBACCO SCREEN RCVD TLK: CPT | Performed by: INTERNAL MEDICINE

## 2021-08-16 PROCEDURE — G8417 CALC BMI ABV UP PARAM F/U: HCPCS | Performed by: INTERNAL MEDICINE

## 2021-08-16 PROCEDURE — 3017F COLORECTAL CA SCREEN DOC REV: CPT | Performed by: INTERNAL MEDICINE

## 2021-08-16 RX ORDER — ASPIRIN 81 MG/1
TABLET ORAL
Qty: 90 TABLET | Refills: 1 | Status: SHIPPED | OUTPATIENT
Start: 2021-08-16 | End: 2022-02-23 | Stop reason: SDUPTHER

## 2021-08-16 RX ORDER — NAPROXEN 500 MG/1
TABLET ORAL
Qty: 180 TABLET | Refills: 0 | Status: SHIPPED | OUTPATIENT
Start: 2021-08-16 | End: 2021-11-19

## 2021-08-16 RX ORDER — POLYETHYLENE GLYCOL 3350 17 G
2 POWDER IN PACKET (EA) ORAL
Qty: 100 LOZENGE | Refills: 3 | Status: SHIPPED | OUTPATIENT
Start: 2021-08-16 | End: 2022-02-23

## 2021-08-16 ASSESSMENT — ENCOUNTER SYMPTOMS
BLOOD IN STOOL: 0
CONSTIPATION: 0
HEMOPTYSIS: 0
DIARRHEA: 0
COUGH: 0
SPUTUM PRODUCTION: 0
NAUSEA: 0
BACK PAIN: 0
CHEST TIGHTNESS: 0
ORTHOPNEA: 0
SHORTNESS OF BREATH: 0
ABDOMINAL PAIN: 0
VOMITING: 0
ANAL BLEEDING: 0
WHEEZING: 0
CHOKING: 0

## 2021-08-16 ASSESSMENT — VISUAL ACUITY: OU: 1

## 2021-08-16 NOTE — PROGRESS NOTES
Subjective:       Patient ID:     Karlee Robb is a 64 y.o. female who presents for   Chief Complaint   Patient presents with    Discuss Medications     Wellbutrin    Tachycardia       HPI:  Nursing note reviewed and discussed with patient. Chest Pain   This is a new problem. The current episode started in the past 7 days. The onset quality is sudden. The problem occurs intermittently. The problem has been resolved. The pain is present in the lateral region. The pain is moderate. The quality of the pain is described as dull. The pain does not radiate. Associated symptoms include dizziness (when rolling over in bed x2, occasionally when walkign through the house ), exertional chest pressure (occured when she was cutting grass), irregular heartbeat and palpitations (since starting wellbutrin - stopped wellbutrin a week ago ). Pertinent negatives include no abdominal pain, back pain, claudication, cough, diaphoresis, fever, headaches, hemoptysis, leg pain, lower extremity edema, malaise/fatigue, nausea, near-syncope, numbness, orthopnea, PND, shortness of breath, sputum production, syncope, vomiting or weakness. The pain is aggravated by exertion. The treatment provided significant relief. Risk factors include post-menopausal, sedentary lifestyle, smoking/tobacco exposure and stress. Patient's medications, allergies, past medical, surgical, social and family histories were reviewed and updated as appropriate.     Past Medical History:   Diagnosis Date    Hyperlipidemia     Hypothyroidism     Subdural hematoma ([facility] [address] 11/6/2016     Past Surgical History:   Procedure Laterality Date    CERVICAL SPINE SURGERY      HYSTERECTOMY      partial       Social History     Tobacco Use    Smoking status: Current Every Day Smoker     Packs/day: 0.50     Years: 34.00     Pack years: 17.00     Types: Cigarettes     Last attempt to quit: 6/7/2011     Years since quitting: 10.2    Smokeless tobacco: Never Used Substance Use Topics    Alcohol use: Yes     Alcohol/week: 2.0 standard drinks     Types: 2 Cans of beer per week      Patient Active Problem List   Diagnosis    Hypothyroidism    Hypercholesteremia    Allergic conjunctivitis of both eyes    Generalized pain    Other chest pain    Gastritis    Tobacco abuse    Overweight (BMI 25.0-29. 9)    Elevated fasting blood sugar    Heartburn    Chronic right-sided low back pain without sciatica         Prior to Visit Medications    Medication Sig Taking? Authorizing Provider   amLODIPine (NORVASC) 2.5 MG tablet take 1 tablet by mouth once daily Yes Kiana Waters MD   tiZANidine (ZANAFLEX) 2 MG tablet Take 1 tablet by mouth 3 times daily as needed (back pain) Yes Nicole Donato MD   levothyroxine (SYNTHROID) 88 MCG tablet Take 1 tablet by mouth Daily Yes Nicole Donato MD   cetirizine (ZYRTEC) 10 MG tablet Take 1 tablet by mouth daily Yes Kiana Waters MD   atorvastatin (LIPITOR) 10 MG tablet Take 1 tablet by mouth daily Yes Kiana Waters MD   famotidine (PEPCID) 20 MG tablet Take 1 tablet by mouth 2 times daily Yes Kiana Waters MD   fluticasone (FLONASE) 50 MCG/ACT nasal spray instill 1 spray into each nostril once daily Yes Kiana Waters MD   naproxen (NAPROSYN) 500 MG tablet take 1 tablet by mouth twice a day with meals Yes Nicole Donato MD   SM ASPIRIN ADULT LOW STRENGTH 81 MG EC tablet take 1 tablet by mouth once daily Yes Nicole Donato MD   nitroGLYCERIN (NITROSTAT) 0.4 MG SL tablet Place 1 tablet under the tongue as needed for Chest pain Yes Kiana Waters MD     Review of Systems  Review of Systems   Constitutional: Negative for diaphoresis, fatigue, fever, malaise/fatigue and unexpected weight change. Respiratory: Negative for cough, hemoptysis, sputum production, choking, chest tightness, shortness of breath and wheezing.     Cardiovascular: Positive for chest pain and palpitations (since starting wellbutrin - stopped wellbutrin a week ago ). Negative for orthopnea, claudication, leg swelling, syncope, PND and near-syncope. Gastrointestinal: Negative for abdominal pain, anal bleeding, blood in stool, constipation, diarrhea, nausea and vomiting. Endocrine: Negative. Musculoskeletal: Negative for back pain, joint swelling and myalgias. Skin: Negative. Neurological: Positive for dizziness (when rolling over in bed x2, occasionally when walkign through the house ). Negative for weakness, numbness and headaches. Psychiatric/Behavioral: Negative for sleep disturbance. All other systems reviewed and are negative. Objective:       Physical Exam:  /66 (Site: Left Upper Arm, Position: Sitting, Cuff Size: Medium Adult)   Pulse 71   Temp 97.4 °F (36.3 °C) (Temporal)   Wt 152 lb 3.2 oz (69 kg)   SpO2 99%   BMI 27.84 kg/m²    Wt Readings from Last 3 Encounters:   08/16/21 152 lb 3.2 oz (69 kg)   06/16/21 152 lb 12.8 oz (69.3 kg)   04/26/21 153 lb (69.4 kg)       Physical Exam  Vitals and nursing note reviewed. Constitutional:       General: She is not in acute distress. Appearance: She is well-developed. She is not ill-appearing. Eyes:      General: Lids are normal. Vision grossly intact. Cardiovascular:      Rate and Rhythm: Normal rate and regular rhythm. Heart sounds: Normal heart sounds, S1 normal and S2 normal. No murmur heard. No friction rub. No gallop. Pulmonary:      Effort: Pulmonary effort is normal. No respiratory distress. Breath sounds: Normal breath sounds. No wheezing. Abdominal:      General: Bowel sounds are normal.      Palpations: Abdomen is soft. There is no mass. Tenderness: There is no abdominal tenderness. There is no guarding. Musculoskeletal:         General: Normal range of motion. Skin:     General: Skin is warm and dry. Capillary Refill: Capillary refill takes less than 2 seconds.    Neurological:      General: No focal deficit present. Mental Status: She is alert and oriented to person, place, and time. Data Review  No visits with results within 2 Month(s) from this visit. Latest known visit with results is:   Hospital Outpatient Visit on 04/09/2021   Component Date Value    Hemoglobin A1C 04/09/2021 5.5     Estimated Avg Glucose 04/09/2021 111           Assessment/Plan:      1. Other chest pain  Resolved - likely needed to get wellbutrin metabolized out   Call if persistent 0 needs cardiology referral   - aspirin (SM ASPIRIN ADULT LOW STRENGTH) 81 MG EC tablet; take 1 tablet by mouth once daily  Dispense: 90 tablet; Refill: 1    2. Generalized pain  - naproxen (NAPROSYN) 500 MG tablet; take 1 tablet by mouth twice a day with meals  Dispense: 180 tablet; Refill: 0    3. Tobacco abuse  - nicotine polacrilex (NICORETTE) 2 MG lozenge; Take 1 lozenge by mouth every hour as needed for Smoking cessation  Dispense: 100 lozenge; Refill: 3    4.  Hypercholesteremia  c ontinue atorvastatin            Health Maintenance Due   Topic Date Due    DTaP/Tdap/Td vaccine (1 - Tdap) Never done    Shingles Vaccine (1 of 2) Never done       Electronically signed by Anuj Berumen MD on 8/16/2021 at 12:06 PM

## 2021-08-16 NOTE — PROGRESS NOTES
Pt is here to discuss medications. She was taking Wellbutrin fir a week but started having palpitations, and heart racing. She states her insides were feeling jittery. While cutting the grass on 8/14/2021 she had chest pains.

## 2021-09-14 ENCOUNTER — TELEPHONE (OUTPATIENT)
Dept: FAMILY MEDICINE CLINIC | Age: 61
End: 2021-09-14

## 2021-09-14 RX ORDER — VARENICLINE TARTRATE 1 MG/1
1 TABLET, FILM COATED ORAL 2 TIMES DAILY
Qty: 180 TABLET | Refills: 1 | Status: SHIPPED | OUTPATIENT
Start: 2021-09-14 | End: 2022-02-23 | Stop reason: SINTOL

## 2021-09-14 NOTE — TELEPHONE ENCOUNTER
Patient called in regards to Chantix being recalled. Pharmacy stated generic brand is not effected.     Varenicline

## 2021-10-04 DIAGNOSIS — R07.89 OTHER CHEST PAIN: ICD-10-CM

## 2021-10-04 RX ORDER — AMLODIPINE BESYLATE 2.5 MG/1
TABLET ORAL
Qty: 90 TABLET | Refills: 1 | OUTPATIENT
Start: 2021-10-04

## 2021-10-21 DIAGNOSIS — R52 GENERALIZED PAIN: ICD-10-CM

## 2021-10-21 RX ORDER — NAPROXEN 500 MG/1
TABLET ORAL
Qty: 180 TABLET | Refills: 0 | OUTPATIENT
Start: 2021-10-21

## 2021-10-28 DIAGNOSIS — E03.9 HYPOTHYROIDISM, UNSPECIFIED TYPE: Chronic | ICD-10-CM

## 2021-10-28 DIAGNOSIS — R12 HEARTBURN: ICD-10-CM

## 2021-10-29 RX ORDER — FAMOTIDINE 20 MG/1
TABLET, FILM COATED ORAL
Qty: 180 TABLET | Refills: 1 | Status: SHIPPED | OUTPATIENT
Start: 2021-10-29 | End: 2022-06-21

## 2021-10-29 RX ORDER — LEVOTHYROXINE SODIUM 88 UG/1
TABLET ORAL
Qty: 90 TABLET | Refills: 1 | Status: SHIPPED | OUTPATIENT
Start: 2021-10-29 | End: 2022-05-19

## 2021-10-29 NOTE — TELEPHONE ENCOUNTER
Last visit: 8/16/21  Last Med refill: 04/26/21  Does patient have enough medication for 72 hours: Yes    Next Visit Date:  Future Appointments   Date Time Provider Coretta Pam   11/11/2021 11:30 AM [facility]   12/16/2021 11:15 AM Nicole Iraheta  [address] Maintenance   Topic Date Due    Pneumococcal 0-64 years Vaccine (1 of 2 - PPSV23) Never done    DTaP/Tdap/Td vaccine (1 - Tdap) Never done    Shingles Vaccine (1 of 2) Never done    Flu vaccine (1) Never done    TSH testing  01/04/2022    A1C test (Diabetic or Prediabetic)  04/09/2022    Lipid screen  08/16/2022    Breast cancer screen  11/10/2022    Colon cancer screen fecal DNA test (Cologuard)  06/17/2024    COVID-19 Vaccine  Completed    Hepatitis C screen  Completed    HIV screen  Completed    Hepatitis A vaccine  Aged Out    Hepatitis B vaccine  Aged Out    Hib vaccine  Aged Out    Meningococcal (ACWY) vaccine  Aged Out       Hemoglobin A1C (%)   Date Value   04/09/2021 5.5   01/04/2021 5.6   12/04/2019 5.6             ( goal A1C is < 7)   No results found for: LABMICR  LDL Cholesterol (mg/dL)   Date Value   08/16/2021 138 (H)   01/04/2021 102       (goal LDL is <100)   AST (U/L)   Date Value   08/16/2021 25     ALT (U/L)   Date Value   08/16/2021 23     BUN (mg/dL)   Date Value   08/16/2021 10     BP Readings from Last 3 Encounters:   08/16/21 110/66   06/16/21 108/68   04/26/21 104/76          (goal 120/80)    All Future Testing planned in [facility]  Lab Frequency Next Occurrence               Patient Active Problem List:     Hypothyroidism     Hypercholesteremia     Allergic conjunctivitis of both eyes     Generalized pain     Other chest pain     Gastritis     Tobacco abuse     Overweight (BMI 25.0-29. 9)     Elevated fasting blood sugar     Heartburn     Chronic right-sided low back pain without sciatica

## 2021-11-11 ENCOUNTER — HOSPITAL ENCOUNTER (OUTPATIENT)
Dept: WOMENS IMAGING | Age: 61
Discharge: HOME | End: 2021-11-13
Payer: MEDICARE

## 2021-11-11 DIAGNOSIS — Z12.31 ENCOUNTER FOR SCREENING MAMMOGRAM FOR MALIGNANT NEOPLASM OF BREAST: ICD-10-CM

## 2021-11-11 PROCEDURE — 77063 BREAST TOMOSYNTHESIS BI: CPT

## 2021-11-19 DIAGNOSIS — R52 GENERALIZED PAIN: ICD-10-CM

## 2021-11-19 RX ORDER — NAPROXEN 500 MG/1
TABLET ORAL
Qty: 180 TABLET | Refills: 1 | Status: SHIPPED | OUTPATIENT
Start: 2021-11-19 | End: 2022-05-19 | Stop reason: SDUPTHER

## 2021-11-19 NOTE — TELEPHONE ENCOUNTER
Last visit: 8/16/21  Last Med refill: 8/16/21  Does patient have enough medication for 72 hours: No:     Next Visit Date:  Future Appointments   Date Time Provider Coretta Orozco   12/16/2021 11:15 AM Nicole Hidalgo  [facility] [facility]   Topic Date Due    Pneumococcal 0-64 years Vaccine (1 of 2 - PPSV23) Never done    DTaP/Tdap/Td vaccine (1 - Tdap) Never done    Shingles Vaccine (1 of 2) Never done    Flu vaccine (1) Never done    COVID-19 Vaccine (3 - Booster for Moderna series) 10/12/2021    TSH testing  01/04/2022    A1C test (Diabetic or Prediabetic)  04/09/2022    Lipid screen  08/16/2022    Breast cancer screen  11/11/2022    Colon cancer screen fecal DNA test (Cologuard)  06/17/2024    Hepatitis C screen  Completed    HIV screen  Completed    Hepatitis A vaccine  Aged Out    Hepatitis B vaccine  Aged Out    Hib vaccine  Aged Out    Meningococcal (ACWY) vaccine  Aged Out       Hemoglobin A1C (%)   Date Value   04/09/2021 5.5   01/04/2021 5.6   12/04/2019 5.6             ( goal A1C is < 7)   No results found for: LABMICR  LDL Cholesterol (mg/dL)   Date Value   08/16/2021 138 (H)   01/04/2021 102       (goal LDL is <100)   AST (U/L)   Date Value   08/16/2021 25     ALT (U/L)   Date Value   08/16/2021 23     BUN (mg/dL)   Date Value   08/16/2021 10     BP Readings from Last 3 Encounters:   08/16/21 110/66   06/16/21 108/68   04/26/21 104/76          (goal 120/80)    All Future Testing planned in [facility]  Lab Frequency Next Occurrence               Patient Active Problem List:     Hypothyroidism     Hypercholesteremia     Allergic conjunctivitis of both eyes     Generalized pain     Other chest pain     Gastritis     Tobacco abuse     Overweight (BMI 25.0-29. 9)     Elevated fasting blood sugar     Heartburn     Chronic right-sided low back pain without sciatica

## 2021-12-02 DIAGNOSIS — H10.13 ALLERGIC CONJUNCTIVITIS OF BOTH EYES: ICD-10-CM

## 2021-12-02 DIAGNOSIS — E78.00 HYPERCHOLESTEREMIA: ICD-10-CM

## 2021-12-02 NOTE — TELEPHONE ENCOUNTER
Last visit: 8/16/21  Last Med refill: 4/26/21, 3/29/21  Does patient have enough medication for 72 hours: No:     Next Visit Date:  Future Appointments   Date Time Provider Coretta Orozco   12/16/2021 11:15 AM Nicole Peguero  [facility] [facility]   Topic Date Due    Pneumococcal 0-64 years Vaccine (1 of 2 - PPSV23) Never done    DTaP/Tdap/Td vaccine (1 - Tdap) Never done    Shingles Vaccine (1 of 2) Never done    Flu vaccine (1) Never done    COVID-19 Vaccine (3 - Booster for Moderna series) 10/12/2021    TSH testing  01/04/2022    A1C test (Diabetic or Prediabetic)  04/09/2022    Lipid screen  08/16/2022    Breast cancer screen  11/11/2022    Colon cancer screen fecal DNA test (Cologuard)  06/17/2024    Hepatitis C screen  Completed    HIV screen  Completed    Hepatitis A vaccine  Aged Out    Hepatitis B vaccine  Aged Out    Hib vaccine  Aged Out    Meningococcal (ACWY) vaccine  Aged Out       Hemoglobin A1C (%)   Date Value   04/09/2021 5.5   01/04/2021 5.6   12/04/2019 5.6             ( goal A1C is < 7)   No results found for: LABMICR  LDL Cholesterol (mg/dL)   Date Value   08/16/2021 138 (H)   01/04/2021 102       (goal LDL is <100)   AST (U/L)   Date Value   08/16/2021 25     ALT (U/L)   Date Value   08/16/2021 23     BUN (mg/dL)   Date Value   08/16/2021 10     BP Readings from Last 3 Encounters:   08/16/21 110/66   06/16/21 108/68   04/26/21 104/76          (goal 120/80)    All Future Testing planned in [facility]  Lab Frequency Next Occurrence               Patient Active Problem List:     Hypothyroidism     Hypercholesteremia     Allergic conjunctivitis of both eyes     Generalized pain     Other chest pain     Gastritis     Tobacco abuse     Overweight (BMI 25.0-29. 9)     Elevated fasting blood sugar     Heartburn     Chronic right-sided low back pain without sciatica

## 2021-12-06 RX ORDER — FLUTICASONE PROPIONATE 50 MCG
SPRAY, SUSPENSION (ML) NASAL
Qty: 16 G | Refills: 5 | Status: SHIPPED | OUTPATIENT
Start: 2021-12-06 | End: 2022-06-06

## 2021-12-06 RX ORDER — ATORVASTATIN CALCIUM 10 MG/1
TABLET, FILM COATED ORAL
Qty: 90 TABLET | Refills: 1 | Status: SHIPPED | OUTPATIENT
Start: 2021-12-06 | End: 2022-06-21

## 2021-12-20 DIAGNOSIS — G89.29 CHRONIC RIGHT-SIDED LOW BACK PAIN WITHOUT SCIATICA: ICD-10-CM

## 2021-12-20 DIAGNOSIS — M54.50 CHRONIC RIGHT-SIDED LOW BACK PAIN WITHOUT SCIATICA: ICD-10-CM

## 2021-12-20 RX ORDER — TIZANIDINE 2 MG/1
TABLET ORAL
Qty: 30 TABLET | Refills: 0 | Status: SHIPPED | OUTPATIENT
Start: 2021-12-20 | End: 2022-02-23 | Stop reason: SDUPTHER

## 2021-12-20 NOTE — TELEPHONE ENCOUNTER
Last visit: 8/16/21  Last Med refill: 6/16/21  Does patient have enough medication for 72 hours: No:     Next Visit Date:  Future Appointments   Date Time Provider Coretta Orozco   2/23/2022  2:15 PM Nicole Salinas  [facility] [facility]   Topic Date Due    Pneumococcal 0-64 years Vaccine (1 of 2 - PPSV23) Never done    DTaP/Tdap/Td vaccine (1 - Tdap) Never done    Shingles Vaccine (1 of 2) Never done    Flu vaccine (1) Never done    COVID-19 Vaccine (3 - Booster for Moderna series) 10/12/2021    TSH testing  01/04/2022    A1C test (Diabetic or Prediabetic)  04/09/2022    Lipid screen  08/16/2022    Breast cancer screen  11/11/2022    Colon cancer screen fecal DNA test (Cologuard)  06/17/2024    Hepatitis C screen  Completed    HIV screen  Completed    Hepatitis A vaccine  Aged Out    Hepatitis B vaccine  Aged Out    Hib vaccine  Aged Out    Meningococcal (ACWY) vaccine  Aged Out       Hemoglobin A1C (%)   Date Value   04/09/2021 5.5   01/04/2021 5.6   12/04/2019 5.6             ( goal A1C is < 7)   No results found for: LABMICR  LDL Cholesterol (mg/dL)   Date Value   08/16/2021 138 (H)   01/04/2021 102       (goal LDL is <100)   AST (U/L)   Date Value   08/16/2021 25     ALT (U/L)   Date Value   08/16/2021 23     BUN (mg/dL)   Date Value   08/16/2021 10     BP Readings from Last 3 Encounters:   08/16/21 110/66   06/16/21 108/68   04/26/21 104/76          (goal 120/80)    All Future Testing planned in [facility]  Lab Frequency Next Occurrence               Patient Active Problem List:     Hypothyroidism     Hypercholesteremia     Allergic conjunctivitis of both eyes     Generalized pain     Other chest pain     Gastritis     Tobacco abuse     Overweight (BMI 25.0-29. 9)     Elevated fasting blood sugar     Heartburn     Chronic right-sided low back pain without sciatica

## 2021-12-24 DIAGNOSIS — R07.89 OTHER CHEST PAIN: ICD-10-CM

## 2021-12-27 RX ORDER — AMLODIPINE BESYLATE 2.5 MG/1
TABLET ORAL
Qty: 90 TABLET | Refills: 1 | Status: SHIPPED | OUTPATIENT
Start: 2021-12-27 | End: 2022-06-06

## 2022-01-05 DIAGNOSIS — H10.13 ALLERGIC CONJUNCTIVITIS OF BOTH EYES: ICD-10-CM

## 2022-01-05 NOTE — TELEPHONE ENCOUNTER
Last visit: 08/16/2021  Last Med refill: 04/26/2021  Does patient have enough medication for 72 hours: No:     Next Visit Date:  Future Appointments   Date Time Provider Coretta Jonesi   2/23/2022  2:15 PM Nicole Norton  [facility] [address] [facility]   Topic Date Due    Pneumococcal 0-64 years Vaccine (1 of 2 - PPSV23) Never done    DTaP/Tdap/Td vaccine (1 - Tdap) Never done    Shingles Vaccine (1 of 2) Never done    Flu vaccine (1) Never done    COVID-19 Vaccine (3 - Booster for Moderna series) 10/12/2021    TSH testing  01/04/2022    A1C test (Diabetic or Prediabetic)  04/09/2022    Depression Screen  04/26/2022    Lipid screen  08/16/2022    Breast cancer screen  11/11/2022    Colon cancer screen fecal DNA test (Cologuard)  06/17/2024    Hepatitis C screen  Completed    HIV screen  Completed    Hepatitis A vaccine  Aged Out    Hepatitis B vaccine  Aged Out    Hib vaccine  Aged Out    Meningococcal (ACWY) vaccine  Aged Out       Hemoglobin A1C (%)   Date Value   04/09/2021 5.5   01/04/2021 5.6   12/04/2019 5.6             ( goal A1C is < 7)   No results found for: LABMICR  LDL Cholesterol (mg/dL)   Date Value   08/16/2021 138 (H)   01/04/2021 102       (goal LDL is <100)   AST (U/L)   Date Value   08/16/2021 25     ALT (U/L)   Date Value   08/16/2021 23     BUN (mg/dL)   Date Value   08/16/2021 10     BP Readings from Last 3 Encounters:   08/16/21 110/66   06/16/21 108/68   04/26/21 104/76          (goal 120/80)    All Future Testing planned in [facility]  Lab Frequency Next Occurrence               Patient Active Problem List:     Hypothyroidism     Hypercholesteremia     Allergic conjunctivitis of both eyes     Generalized pain     Other chest pain     Gastritis     Tobacco abuse     Overweight (BMI 25.0-29. 9)     Elevated fasting blood sugar     Heartburn     Chronic right-sided low back pain without sciatica

## 2022-01-07 RX ORDER — CETIRIZINE HYDROCHLORIDE 10 MG/1
TABLET ORAL
Qty: 90 TABLET | Refills: 1 | Status: SHIPPED | OUTPATIENT
Start: 2022-01-07 | End: 2022-07-18

## 2022-01-27 DIAGNOSIS — H10.13 ALLERGIC CONJUNCTIVITIS OF BOTH EYES: ICD-10-CM

## 2022-01-27 RX ORDER — FLUTICASONE PROPIONATE 50 MCG
SPRAY, SUSPENSION (ML) NASAL
Qty: 16 G | Refills: 5 | OUTPATIENT
Start: 2022-01-27

## 2022-02-23 ENCOUNTER — OFFICE VISIT (OUTPATIENT)
Dept: FAMILY MEDICINE CLINIC | Age: 62
End: 2022-02-23
Payer: MEDICARE

## 2022-02-23 VITALS
DIASTOLIC BLOOD PRESSURE: 78 MMHG | HEART RATE: 75 BPM | BODY MASS INDEX: 26.7 KG/M2 | OXYGEN SATURATION: 98 % | SYSTOLIC BLOOD PRESSURE: 114 MMHG | TEMPERATURE: 98.1 F | WEIGHT: 146 LBS

## 2022-02-23 DIAGNOSIS — E03.9 HYPOTHYROIDISM, UNSPECIFIED TYPE: ICD-10-CM

## 2022-02-23 DIAGNOSIS — Z13.0 SCREENING, ANEMIA, DEFICIENCY, IRON: ICD-10-CM

## 2022-02-23 DIAGNOSIS — Z72.0 TOBACCO ABUSE: ICD-10-CM

## 2022-02-23 DIAGNOSIS — E66.3 OVERWEIGHT (BMI 25.0-29.9): ICD-10-CM

## 2022-02-23 DIAGNOSIS — E78.00 HYPERCHOLESTEREMIA: Primary | ICD-10-CM

## 2022-02-23 DIAGNOSIS — E55.9 VITAMIN D DEFICIENCY: ICD-10-CM

## 2022-02-23 DIAGNOSIS — G89.29 CHRONIC RIGHT-SIDED LOW BACK PAIN WITHOUT SCIATICA: ICD-10-CM

## 2022-02-23 DIAGNOSIS — R07.89 OTHER CHEST PAIN: ICD-10-CM

## 2022-02-23 DIAGNOSIS — M54.50 CHRONIC RIGHT-SIDED LOW BACK PAIN WITHOUT SCIATICA: ICD-10-CM

## 2022-02-23 PROCEDURE — G8417 CALC BMI ABV UP PARAM F/U: HCPCS | Performed by: INTERNAL MEDICINE

## 2022-02-23 PROCEDURE — 4004F PT TOBACCO SCREEN RCVD TLK: CPT | Performed by: INTERNAL MEDICINE

## 2022-02-23 PROCEDURE — G8484 FLU IMMUNIZE NO ADMIN: HCPCS | Performed by: INTERNAL MEDICINE

## 2022-02-23 PROCEDURE — 3017F COLORECTAL CA SCREEN DOC REV: CPT | Performed by: INTERNAL MEDICINE

## 2022-02-23 PROCEDURE — 99214 OFFICE O/P EST MOD 30 MIN: CPT | Performed by: INTERNAL MEDICINE

## 2022-02-23 PROCEDURE — G8427 DOCREV CUR MEDS BY ELIG CLIN: HCPCS | Performed by: INTERNAL MEDICINE

## 2022-02-23 RX ORDER — ASPIRIN 81 MG/1
TABLET ORAL
Qty: 90 TABLET | Refills: 1 | Status: SHIPPED | OUTPATIENT
Start: 2022-02-23 | End: 2022-08-23 | Stop reason: SDUPTHER

## 2022-02-23 RX ORDER — TIZANIDINE 2 MG/1
TABLET ORAL
Qty: 30 TABLET | Refills: 1 | Status: SHIPPED | OUTPATIENT
Start: 2022-02-23 | End: 2022-08-23 | Stop reason: SDUPTHER

## 2022-02-23 RX ORDER — VARENICLINE TARTRATE 1 MG/1
1 TABLET, FILM COATED ORAL 2 TIMES DAILY
Qty: 180 TABLET | Refills: 1 | Status: SHIPPED | OUTPATIENT
Start: 2022-02-23 | End: 2022-08-23

## 2022-02-23 ASSESSMENT — VISUAL ACUITY: OU: 1

## 2022-02-23 ASSESSMENT — ENCOUNTER SYMPTOMS
CHEST TIGHTNESS: 0
NAUSEA: 0
VOMITING: 0
CHOKING: 0
WHEEZING: 0
ABDOMINAL PAIN: 0
SHORTNESS OF BREATH: 0
COUGH: 0
BLOOD IN STOOL: 0
ANAL BLEEDING: 0
DIARRHEA: 0
CONSTIPATION: 0

## 2022-02-23 NOTE — PATIENT INSTRUCTIONS
Patient Education        Stopping Smoking: Care Instructions  Your Care Instructions     Cigarette smokers crave the nicotine in cigarettes. Giving it up is much harder than simply changing a habit. Your body has to stop craving the nicotine. It is hard to quit, but you can do it. There are many tools that people use to quit smoking. You may find that combining tools works best for you. There are several steps to quitting. First you get ready to quit. Then you get support to help you. After that, you learn new skills and behaviors to become a nonsmoker. For many people, a necessary step is getting and using medicine. Your doctor will help you set up the plan that best meets your needs. You may want to attend a smoking cessation program to help you quit smoking. When you choose a program, look for one that has proven success. Ask your doctor for ideas. You will greatly increase your chances of success if you take medicine as well as get counseling or join a cessation program.  Some of the changes you feel when you first quit tobacco are uncomfortable. Your body will miss the nicotine at first, and you may feel short-tempered and grumpy. You may have trouble sleeping or concentrating. Medicine can help you deal with these symptoms. You may struggle with changing your smoking habits and rituals. The last step is the tricky one: Be prepared for the smoking urge to continue for a time. This is a lot to deal with, but keep at it. You will feel better. Follow-up care is a key part of your treatment and safety. Be sure to make and go to all appointments, and call your doctor if you are having problems. It's also a good idea to know your test results and keep a list of the medicines you take. How can you care for yourself at home? · Ask your family, friends, and coworkers for support. You have a better chance of quitting if you have help and support.   · Join a support group, such as Nicotine Anonymous, for people who are trying to quit smoking. · Consider signing up for a smoking cessation program, such as the American Lung Association's Freedom from Smoking program.  · Get text messaging support. Go to the website at www.smokefree. gov to sign up for the [facility] program.  · Set a quit date. Pick your date carefully so that it is not right in the middle of a big deadline or stressful time. Once you quit, do not even take a puff. Get rid of all ashtrays and lighters after your last cigarette. Clean your house and your clothes so that they do not smell of smoke. · Learn how to be a nonsmoker. Think about ways you can avoid those things that make you reach for a cigarette. ? Avoid situations that put you at greatest risk for smoking. For some people, it is hard to have a drink with friends without smoking. For others, they might skip a coffee break with coworkers who smoke. ? Change your daily routine. Take a different route to work or eat a meal in a different place. · Cut down on stress. Calm yourself or release tension by doing an activity you enjoy, such as reading a book, taking a hot bath, or gardening. · Talk to your doctor or pharmacist about nicotine replacement therapy, which replaces the nicotine in your body. You still get nicotine but you do not use tobacco. Nicotine replacement products help you slowly reduce the amount of nicotine you need. These products come in several forms, many of them available over-the-counter:  ? Nicotine patches  ? Nicotine gum and lozenges  ? Nicotine inhaler  · Ask your doctor about bupropion (Wellbutrin) or varenicline (Chantix), which are prescription medicines. They do not contain nicotine. They help you by reducing withdrawal symptoms, such as stress and anxiety. · Some people find hypnosis, acupuncture, and massage helpful for ending the smoking habit. · Eat a healthy diet and get regular exercise.  Having healthy habits will help your body move past its craving for nicotine. · Be prepared to keep trying. Most people are not successful the first few times they try to quit. Do not get mad at yourself if you smoke again. Make a list of things you learned and think about when you want to try again, such as next week, next month, or next year. Where can you learn more? Go to https://Convoepegavin.Bundle. org and sign in to your [employer] account. Enter I127 in the [employer]los box to learn more about \"Stopping Smoking: Care Instructions. \"     If you do not have an account, please click on the \"Sign Up Now\" link. Current as of: February 11, 2021               Content Version: 13.1  © 2006-2021 Healthwise[employer] Incorporated. Care instructions adapted under license by [facility] ([facility]). If you have questions about a medical condition or this instruction, always ask your healthcare professional. [address] any warranty or liability for your use of this information.

## 2022-02-23 NOTE — PROGRESS NOTES
Subjective:       Patient ID:     Giovana Anton is a 63 y.o. female who presents for   Chief Complaint   Patient presents with    Hyperlipidemia    Hypothyroidism       HPI:  Nursing note reviewed and discussed with patient. Hypothyroidism - Doing well with synthroid. Current symptoms: none. Patient denies change in energy level, diarrhea, heat / cold intolerance, nervousness, palpitations and weight changes. Onset of symptoms was several years ago. Symptoms have been well-controlled. Hypertension-tolerating current regimen without chest pain, palpitations, dizziness, peripheral edema, dyspnea on exertion, orthopnea, paroxysmal nocturnal dyspnea. Hyperlipidemia-tolerating current regimen without myalgias, dyspepsia, jaundice. Mostly compliant with diet recommendations for low salt diet, tries to limit greasy/cheesy/fried foods, not very compliant with exercise recommendations. Cardiovascular risk factors: advanced age (older than 54 for men, 72 for women), dyslipidemia, hypertension and sedentary lifestyle        Patient's medications, allergies, past medical, surgical, social and family histories were reviewed and updated as appropriate. Past Medical History:   Diagnosis Date    Hyperlipidemia     Hypothyroidism     Subdural hematoma ([facility] [address] 2016     Past Surgical History:   Procedure Laterality Date    CERVICAL SPINE SURGERY      HYSTERECTOMY      partial    OVARY REMOVAL      Had cyst on ovaries       Social History     Tobacco Use    Smoking status: Light Tobacco Smoker     Packs/day: 0.25     Years: 34.00     Pack years: 8.50     Types: Cigarettes     Last attempt to quit: 2021     Years since quittin.7    Smokeless tobacco: Never Used   Substance Use Topics    Alcohol use:  Yes     Alcohol/week: 4.0 standard drinks     Types: 4 Cans of beer per week      Patient Active Problem List   Diagnosis    Hypothyroidism    Hypercholesteremia    Allergic conjunctivitis of both eyes    Generalized pain    Other chest pain    Gastritis    Tobacco abuse    Overweight (BMI 25.0-29. 9)    Elevated fasting blood sugar    Heartburn    Chronic right-sided low back pain without sciatica         Prior to Visit Medications    Medication Sig Taking? Authorizing Provider   cetirizine (ZYRTEC) 10 MG tablet take 1 tablet by mouth once daily Yes Nicole Vazquez MD   amLODIPine (NORVASC) 2.5 MG tablet take 1 tablet by mouth once daily Yes Nicole Vazquez MD   tiZANidine (ZANAFLEX) 2 MG tablet take 1 tablet by mouth three times a day if needed for BACK PAIN Yes Idelia Kussmaul, MD   fluticasone (FLONASE) 50 MCG/ACT nasal spray instill 1 spray into each nostril once daily Yes Idelia Kussmaul, MD   atorvastatin (LIPITOR) 10 MG tablet take 1 tablet by mouth once daily Yes Nicole Vazquez MD   naproxen (NAPROSYN) 500 MG tablet take 1 tablet by mouth twice a day with meals Yes BETHANY Hennessy NP   famotidine (PEPCID) 20 MG tablet take 1 tablet by mouth twice a day Yes Nicole Vazquez MD   levothyroxine (SYNTHROID) 88 MCG tablet take 1 tablet by mouth once daily Yes Nicole Vazquez MD   aspirin (SM ASPIRIN ADULT LOW STRENGTH) 81 MG EC tablet take 1 tablet by mouth once daily Yes Idelia Kussmaul, MD     Review of Systems  Review of Systems   Constitutional: Negative for fatigue, fever and unexpected weight change. Respiratory: Negative for cough, choking, chest tightness, shortness of breath and wheezing. Cardiovascular: Negative for chest pain, palpitations and leg swelling. Gastrointestinal: Negative for abdominal pain, anal bleeding, blood in stool, constipation, diarrhea, nausea and vomiting. Endocrine: Negative. Musculoskeletal: Negative for joint swelling and myalgias. Skin: Negative. Neurological: Negative for dizziness. Psychiatric/Behavioral: Negative for sleep disturbance. All other systems reviewed and are negative.          Objective:       Physical Exam:  /78   Pulse 75   Temp 98.1 °F (36.7 °C) (Temporal)   Wt 146 lb (66.2 kg)   SpO2 98%   BMI 26.70 kg/m²    Wt Readings from Last 3 Encounters:   02/23/22 146 lb (66.2 kg)   08/16/21 152 lb 3.2 oz (69 kg)   06/16/21 152 lb 12.8 oz (69.3 kg)       Physical Exam  Vitals and nursing note reviewed. Constitutional:       General: She is not in acute distress. Appearance: She is well-developed. She is not ill-appearing. Eyes:      General: Lids are normal. Vision grossly intact. Cardiovascular:      Rate and Rhythm: Normal rate and regular rhythm. Heart sounds: Normal heart sounds, S1 normal and S2 normal. No murmur heard. No friction rub. No gallop. Pulmonary:      Effort: Pulmonary effort is normal. No respiratory distress. Breath sounds: Normal breath sounds. No wheezing. Abdominal:      General: Bowel sounds are normal.      Palpations: Abdomen is soft. There is no mass. Tenderness: There is no abdominal tenderness. There is no guarding. Musculoskeletal:         General: Normal range of motion. Skin:     General: Skin is warm and dry. Capillary Refill: Capillary refill takes less than 2 seconds. Neurological:      General: No focal deficit present. Mental Status: She is alert and oriented to person, place, and time. Data Review  Labs in chart reviewed        Assessment/Plan:      1. Chronic right-sided low back pain without sciatica  - tiZANidine (ZANAFLEX) 2 MG tablet; take 1 tablet by mouth three times a day if needed for BACK PAIN  Dispense: 30 tablet; Refill: 1    2. Other chest pain  - aspirin (SM ASPIRIN ADULT LOW STRENGTH) 81 MG EC tablet; take 1 tablet by mouth once daily  Dispense: 90 tablet; Refill: 1    3. Hypercholesteremia  Continue atorvastatin  - Comprehensive Metabolic Panel; Future  - Lipid Panel; Future    4. Hypothyroidism, unspecified type  Continue Synthroid  - TSH with Reflex; Future    5.  Tobacco abuse  - CHANTIX CONTINUING MONTH RICH 1 MG tablet; Take 1 tablet by mouth 2 times daily  Dispense: 180 tablet; Refill: 1    6. Overweight (BMI 25.0-29. 9)  Continue diet and lifestyle management    7. Screening, anemia, deficiency, iron  - CBC with Auto Differential; Future    8. Vitamin D deficiency  - Vitamin D 25 Hydroxy;  Future           Health Maintenance Due   Topic Date Due    TSH testing  01/04/2022       Electronically signed by Chyna Silva MD on 2/23/2022 at 2:47 PM

## 2022-03-01 DIAGNOSIS — H10.13 ALLERGIC CONJUNCTIVITIS OF BOTH EYES: ICD-10-CM

## 2022-03-01 RX ORDER — FLUTICASONE PROPIONATE 50 MCG
SPRAY, SUSPENSION (ML) NASAL
Qty: 16 G | Refills: 5 | OUTPATIENT
Start: 2022-03-01

## 2022-03-10 DIAGNOSIS — R07.89 OTHER CHEST PAIN: ICD-10-CM

## 2022-03-10 RX ORDER — AMLODIPINE BESYLATE 2.5 MG/1
TABLET ORAL
Qty: 90 TABLET | Refills: 1 | OUTPATIENT
Start: 2022-03-10

## 2022-04-01 DIAGNOSIS — H10.13 ALLERGIC CONJUNCTIVITIS OF BOTH EYES: ICD-10-CM

## 2022-04-01 RX ORDER — FLUTICASONE PROPIONATE 50 MCG
SPRAY, SUSPENSION (ML) NASAL
Qty: 16 G | Refills: 5 | OUTPATIENT
Start: 2022-04-01

## 2022-05-05 DIAGNOSIS — R07.89 OTHER CHEST PAIN: ICD-10-CM

## 2022-05-05 DIAGNOSIS — H10.13 ALLERGIC CONJUNCTIVITIS OF BOTH EYES: ICD-10-CM

## 2022-05-05 RX ORDER — FLUTICASONE PROPIONATE 50 MCG
SPRAY, SUSPENSION (ML) NASAL
Qty: 16 G | Refills: 5 | OUTPATIENT
Start: 2022-05-05

## 2022-05-05 RX ORDER — AMLODIPINE BESYLATE 2.5 MG/1
TABLET ORAL
Qty: 90 TABLET | Refills: 1 | OUTPATIENT
Start: 2022-05-05

## 2022-05-19 DIAGNOSIS — R52 GENERALIZED PAIN: ICD-10-CM

## 2022-05-19 DIAGNOSIS — E03.9 HYPOTHYROIDISM, UNSPECIFIED TYPE: Chronic | ICD-10-CM

## 2022-05-19 RX ORDER — NAPROXEN 500 MG/1
TABLET ORAL
Qty: 180 TABLET | Refills: 1 | OUTPATIENT
Start: 2022-05-19

## 2022-05-19 RX ORDER — LEVOTHYROXINE SODIUM 88 UG/1
TABLET ORAL
Qty: 90 TABLET | Refills: 1 | Status: SHIPPED | OUTPATIENT
Start: 2022-05-19 | End: 2022-08-23 | Stop reason: SDUPTHER

## 2022-05-19 RX ORDER — NAPROXEN 500 MG/1
TABLET ORAL
Qty: 180 TABLET | Refills: 1 | Status: SHIPPED | OUTPATIENT
Start: 2022-05-19

## 2022-05-19 NOTE — TELEPHONE ENCOUNTER
Last visit: 2/23/22  Last Med refill: 11/19/21, 10/29/21  Does patient have enough medication for 72 hours: No:     Next Visit Date:  Future Appointments   Date Time Provider Coretta Pam   8/23/2022  1:15 PM Nicole Trevino  [facility] [facility]   Topic Date Due    A1C test (Diabetic or Prediabetic)  04/09/2022    Depression Screen  04/26/2022    Shingles vaccine (1 of 2) 02/23/2024 (Originally 10/23/2012)    DTaP/Tdap/Td vaccine (1 - Tdap) 02/23/2025 (Originally 10/23/1981)    Flu vaccine (Season Ended) 02/23/2025 (Originally 9/1/2022)    Pneumococcal 0-64 years Vaccine (1 - PCV) 02/23/2025 (Originally 10/23/1966)    Lipids  08/16/2022    Breast cancer screen  11/11/2022    Colorectal Cancer Screen  06/17/2024    COVID-19 Vaccine  Completed    Hepatitis C screen  Completed    HIV screen  Completed    Hepatitis A vaccine  Aged Out    Hepatitis B vaccine  Aged Out    Hib vaccine  Aged Out    Meningococcal (ACWY) vaccine  Aged Out       Hemoglobin A1C (%)   Date Value   04/09/2021 5.5   01/04/2021 5.6   12/04/2019 5.6             ( goal A1C is < 7)   No results found for: LABMICR  LDL Cholesterol (mg/dL)   Date Value   08/16/2021 138 (H)   01/04/2021 102       (goal LDL is <100)   AST (U/L)   Date Value   08/16/2021 25     ALT (U/L)   Date Value   08/16/2021 23     BUN (mg/dL)   Date Value   08/16/2021 10     BP Readings from Last 3 Encounters:   02/23/22 114/78   08/16/21 110/66   06/16/21 108/68          (goal 120/80)    All Future Testing planned in [facility]  Lab Frequency Next Occurrence   CBC with Auto Differential Once 10/11/2022   Comprehensive Metabolic Panel Once 68/53/0722   Lipid Panel Once 10/11/2022   TSH with Reflex Once 10/11/2022   Vitamin D 25 Hydroxy Once 10/11/2022               Patient Active Problem List:     Hypothyroidism     Hypercholesteremia     Allergic conjunctivitis of both eyes     Generalized pain     Other chest pain     Gastritis Tobacco abuse     Overweight (BMI 25.0-29. 9)     Elevated fasting blood sugar     Heartburn     Chronic right-sided low back pain without sciatica

## 2022-06-04 DIAGNOSIS — R07.89 OTHER CHEST PAIN: ICD-10-CM

## 2022-06-04 DIAGNOSIS — H10.13 ALLERGIC CONJUNCTIVITIS OF BOTH EYES: ICD-10-CM

## 2022-06-06 RX ORDER — FLUTICASONE PROPIONATE 50 MCG
SPRAY, SUSPENSION (ML) NASAL
Qty: 16 G | Refills: 5 | Status: SHIPPED | OUTPATIENT
Start: 2022-06-06

## 2022-06-06 RX ORDER — AMLODIPINE BESYLATE 2.5 MG/1
TABLET ORAL
Qty: 90 TABLET | Refills: 1 | Status: SHIPPED | OUTPATIENT
Start: 2022-06-06 | End: 2022-08-23 | Stop reason: SDUPTHER

## 2022-06-06 NOTE — TELEPHONE ENCOUNTER
Last visit: 2/23/22  Last Med refill: 12/6/21, 12/27/21  Does patient have enough medication for 72 hours: Yes    Next Visit Date:  Future Appointments   Date Time Provider Coretta Orozco   8/23/2022  1:15 PM Nicole Quinones  [address] [facility]   Topic Date Due    A1C test (Diabetic or Prediabetic)  04/09/2022    Depression Screen  04/26/2022    Shingles vaccine (1 of 2) 02/23/2024 (Originally 10/23/2012)    DTaP/Tdap/Td vaccine (1 - Tdap) 02/23/2025 (Originally 10/23/1981)    Flu vaccine (Season Ended) 02/23/2025 (Originally 9/1/2022)    Pneumococcal 0-64 years Vaccine (1 - PCV) 02/23/2025 (Originally 10/23/1966)    Lipids  08/16/2022    Breast cancer screen  11/11/2022    Colorectal Cancer Screen  06/17/2024    COVID-19 Vaccine  Completed    Hepatitis C screen  Completed    HIV screen  Completed    Hepatitis A vaccine  Aged Out    Hepatitis B vaccine  Aged Out    Hib vaccine  Aged Out    Meningococcal (ACWY) vaccine  Aged Out       Hemoglobin A1C (%)   Date Value   04/09/2021 5.5   01/04/2021 5.6   12/04/2019 5.6             ( goal A1C is < 7)   No results found for: LABMICR  LDL Cholesterol (mg/dL)   Date Value   08/16/2021 138 (H)   01/04/2021 102       (goal LDL is <100)   AST (U/L)   Date Value   08/16/2021 25     ALT (U/L)   Date Value   08/16/2021 23     BUN (mg/dL)   Date Value   08/16/2021 10     BP Readings from Last 3 Encounters:   02/23/22 114/78   08/16/21 110/66   06/16/21 108/68          (goal 120/80)    All Future Testing planned in [facility]  Lab Frequency Next Occurrence   CBC with Auto Differential Once 10/11/2022   Comprehensive Metabolic Panel Once 04/35/1152   Lipid Panel Once 10/11/2022   TSH with Reflex Once 10/11/2022   Vitamin D 25 Hydroxy Once 10/11/2022               Patient Active Problem List:     Hypothyroidism     Hypercholesteremia     Allergic conjunctivitis of both eyes     Generalized pain     Other chest pain     Gastritis

## 2022-06-19 DIAGNOSIS — E78.00 HYPERCHOLESTEREMIA: ICD-10-CM

## 2022-06-19 DIAGNOSIS — R12 HEARTBURN: ICD-10-CM

## 2022-06-20 NOTE — TELEPHONE ENCOUNTER
Tobacco abuse     Overweight (BMI 25.0-29. 9)     Elevated fasting blood sugar     Heartburn     Chronic right-sided low back pain without sciatica

## 2022-06-21 RX ORDER — FAMOTIDINE 20 MG/1
TABLET, FILM COATED ORAL
Qty: 180 TABLET | Refills: 1 | Status: SHIPPED | OUTPATIENT
Start: 2022-06-21 | End: 2022-08-23 | Stop reason: SDUPTHER

## 2022-06-21 RX ORDER — ATORVASTATIN CALCIUM 10 MG/1
TABLET, FILM COATED ORAL
Qty: 90 TABLET | Refills: 1 | Status: SHIPPED | OUTPATIENT
Start: 2022-06-21 | End: 2022-08-23 | Stop reason: SDUPTHER

## 2022-07-16 DIAGNOSIS — H10.13 ALLERGIC CONJUNCTIVITIS OF BOTH EYES: ICD-10-CM

## 2022-07-18 RX ORDER — CETIRIZINE HYDROCHLORIDE 10 MG/1
TABLET ORAL
Qty: 90 TABLET | Refills: 1 | Status: SHIPPED | OUTPATIENT
Start: 2022-07-18

## 2022-07-18 NOTE — TELEPHONE ENCOUNTER
Last visit: 2/23/22  Last Med refill: 1/7/22  Does patient have enough medication for 72 hours: No:     Next Visit Date:  Future Appointments   Date Time Provider Coretta Orozco   8/23/2022  1:15 PM Arti Verner Kil,  [address] [facility]   Topic Date Due    COVID-19 Vaccine (4 - Booster for Moderna series) 04/06/2022    A1C test (Diabetic or Prediabetic)  04/09/2022    Depression Screen  04/26/2022    Lipids  08/16/2022    Shingles vaccine (1 of 2) 02/23/2024 (Originally 10/23/2012)    DTaP/Tdap/Td vaccine (1 - Tdap) 02/23/2025 (Originally 10/23/1981)    Flu vaccine (1) 02/23/2025 (Originally 9/1/2022)    Pneumococcal 0-64 years Vaccine (1 - PCV) 02/23/2025 (Originally 10/23/1968)    Breast cancer screen  11/11/2022    Colorectal Cancer Screen  06/17/2024    Hepatitis C screen  Completed    HIV screen  Completed    Hepatitis A vaccine  Aged Out    Hepatitis B vaccine  Aged Out    Hib vaccine  Aged Out    Meningococcal (ACWY) vaccine  Aged Out       Hemoglobin A1C (%)   Date Value   04/09/2021 5.5   01/04/2021 5.6   12/04/2019 5.6             ( goal A1C is < 7)   No results found for: LABMICR  LDL Cholesterol (mg/dL)   Date Value   08/16/2021 138 (H)   01/04/2021 102       (goal LDL is <100)   AST (U/L)   Date Value   08/16/2021 25     ALT (U/L)   Date Value   08/16/2021 23     BUN (mg/dL)   Date Value   08/16/2021 10     BP Readings from Last 3 Encounters:   02/23/22 114/78   08/16/21 110/66   06/16/21 108/68          (goal 120/80)    All Future Testing planned in [facility]  Lab Frequency Next Occurrence   CBC with Auto Differential Once 10/11/2022   Comprehensive Metabolic Panel Once 91/18/4457   Lipid Panel Once 10/11/2022   TSH with Reflex Once 10/11/2022   Vitamin D 25 Hydroxy Once 10/11/2022               Patient Active Problem List:     Hypothyroidism     Hypercholesteremia     Allergic conjunctivitis of both eyes     Generalized pain     Other chest pain     Gastritis     Tobacco abuse     Overweight (BMI 25.0-29. 9)     Elevated fasting blood sugar     Heartburn     Chronic right-sided low back pain without sciatica

## 2022-08-23 ENCOUNTER — OFFICE VISIT (OUTPATIENT)
Dept: FAMILY MEDICINE CLINIC | Age: 62
End: 2022-08-23
Payer: MEDICARE

## 2022-08-23 VITALS
BODY MASS INDEX: 27.36 KG/M2 | WEIGHT: 149.6 LBS | OXYGEN SATURATION: 96 % | DIASTOLIC BLOOD PRESSURE: 72 MMHG | HEART RATE: 76 BPM | SYSTOLIC BLOOD PRESSURE: 112 MMHG | TEMPERATURE: 97.1 F

## 2022-08-23 DIAGNOSIS — R12 HEARTBURN: ICD-10-CM

## 2022-08-23 DIAGNOSIS — E03.9 HYPOTHYROIDISM, UNSPECIFIED TYPE: Chronic | ICD-10-CM

## 2022-08-23 DIAGNOSIS — R52 GENERALIZED PAIN: ICD-10-CM

## 2022-08-23 DIAGNOSIS — M54.50 CHRONIC RIGHT-SIDED LOW BACK PAIN WITHOUT SCIATICA: Primary | ICD-10-CM

## 2022-08-23 DIAGNOSIS — Z12.31 ENCOUNTER FOR SCREENING MAMMOGRAM FOR BREAST CANCER: ICD-10-CM

## 2022-08-23 DIAGNOSIS — E78.00 HYPERCHOLESTEREMIA: ICD-10-CM

## 2022-08-23 DIAGNOSIS — R07.89 OTHER CHEST PAIN: ICD-10-CM

## 2022-08-23 DIAGNOSIS — J30.1 SEASONAL ALLERGIC RHINITIS DUE TO POLLEN: ICD-10-CM

## 2022-08-23 DIAGNOSIS — Z72.0 TOBACCO ABUSE: ICD-10-CM

## 2022-08-23 DIAGNOSIS — G89.29 CHRONIC RIGHT-SIDED LOW BACK PAIN WITHOUT SCIATICA: Primary | ICD-10-CM

## 2022-08-23 PROCEDURE — 3017F COLORECTAL CA SCREEN DOC REV: CPT | Performed by: INTERNAL MEDICINE

## 2022-08-23 PROCEDURE — 99214 OFFICE O/P EST MOD 30 MIN: CPT | Performed by: INTERNAL MEDICINE

## 2022-08-23 PROCEDURE — G8427 DOCREV CUR MEDS BY ELIG CLIN: HCPCS | Performed by: INTERNAL MEDICINE

## 2022-08-23 PROCEDURE — 4004F PT TOBACCO SCREEN RCVD TLK: CPT | Performed by: INTERNAL MEDICINE

## 2022-08-23 PROCEDURE — G8417 CALC BMI ABV UP PARAM F/U: HCPCS | Performed by: INTERNAL MEDICINE

## 2022-08-23 RX ORDER — ASPIRIN 81 MG/1
TABLET ORAL
Qty: 90 TABLET | Refills: 1 | Status: SHIPPED | OUTPATIENT
Start: 2022-08-23

## 2022-08-23 RX ORDER — FAMOTIDINE 20 MG/1
TABLET, FILM COATED ORAL
Qty: 180 TABLET | Refills: 1 | Status: SHIPPED | OUTPATIENT
Start: 2022-08-23

## 2022-08-23 RX ORDER — MONTELUKAST SODIUM 10 MG/1
10 TABLET ORAL NIGHTLY
Qty: 90 TABLET | Refills: 1 | Status: SHIPPED | OUTPATIENT
Start: 2022-08-23

## 2022-08-23 RX ORDER — TIZANIDINE 2 MG/1
TABLET ORAL
Qty: 30 TABLET | Refills: 1 | Status: SHIPPED | OUTPATIENT
Start: 2022-08-23

## 2022-08-23 RX ORDER — AMLODIPINE BESYLATE 2.5 MG/1
TABLET ORAL
Qty: 90 TABLET | Refills: 1 | Status: SHIPPED | OUTPATIENT
Start: 2022-08-23

## 2022-08-23 RX ORDER — ATORVASTATIN CALCIUM 10 MG/1
TABLET, FILM COATED ORAL
Qty: 90 TABLET | Refills: 1 | Status: SHIPPED | OUTPATIENT
Start: 2022-08-23

## 2022-08-23 RX ORDER — LEVOTHYROXINE SODIUM 88 UG/1
TABLET ORAL
Qty: 90 TABLET | Refills: 1 | Status: SHIPPED | OUTPATIENT
Start: 2022-08-23

## 2022-08-23 SDOH — ECONOMIC STABILITY: FOOD INSECURITY: WITHIN THE PAST 12 MONTHS, YOU WORRIED THAT YOUR FOOD WOULD RUN OUT BEFORE YOU GOT MONEY TO BUY MORE.: NEVER TRUE

## 2022-08-23 SDOH — ECONOMIC STABILITY: FOOD INSECURITY: WITHIN THE PAST 12 MONTHS, THE FOOD YOU BOUGHT JUST DIDN'T LAST AND YOU DIDN'T HAVE MONEY TO GET MORE.: NEVER TRUE

## 2022-08-23 ASSESSMENT — PATIENT HEALTH QUESTIONNAIRE - PHQ9
SUM OF ALL RESPONSES TO PHQ QUESTIONS 1-9: 0
SUM OF ALL RESPONSES TO PHQ9 QUESTIONS 1 & 2: 0
SUM OF ALL RESPONSES TO PHQ QUESTIONS 1-9: 0
2. FEELING DOWN, DEPRESSED OR HOPELESS: 0
1. LITTLE INTEREST OR PLEASURE IN DOING THINGS: 0
SUM OF ALL RESPONSES TO PHQ QUESTIONS 1-9: 0
SUM OF ALL RESPONSES TO PHQ QUESTIONS 1-9: 0

## 2022-08-23 ASSESSMENT — VISUAL ACUITY: OU: 1

## 2022-08-23 ASSESSMENT — ENCOUNTER SYMPTOMS
CHEST TIGHTNESS: 0
COUGH: 0
WHEEZING: 0
CONSTIPATION: 0
ABDOMINAL PAIN: 0
SHORTNESS OF BREATH: 0
DIARRHEA: 0
CHOKING: 0
ANAL BLEEDING: 0
VOMITING: 0
NAUSEA: 0
BLOOD IN STOOL: 0

## 2022-08-23 ASSESSMENT — SOCIAL DETERMINANTS OF HEALTH (SDOH): HOW HARD IS IT FOR YOU TO PAY FOR THE VERY BASICS LIKE FOOD, HOUSING, MEDICAL CARE, AND HEATING?: SOMEWHAT HARD

## 2022-08-23 NOTE — PROGRESS NOTES
Pt is here for a regular 6 month f/u    Pt states her heart has sydney palpitating, notices it is worse when the humidity is up, states it is every day when the humidity is up, more often in the last month   States her legs hurt and feel like jello

## 2022-10-14 ENCOUNTER — HOSPITAL ENCOUNTER (OUTPATIENT)
Age: 62
Setting detail: SPECIMEN
Discharge: HOME | End: 2022-10-14

## 2022-10-14 DIAGNOSIS — Z13.0 SCREENING, ANEMIA, DEFICIENCY, IRON: ICD-10-CM

## 2022-10-14 DIAGNOSIS — E03.9 HYPOTHYROIDISM, UNSPECIFIED TYPE: ICD-10-CM

## 2022-10-14 DIAGNOSIS — E55.9 VITAMIN D DEFICIENCY: ICD-10-CM

## 2022-10-14 DIAGNOSIS — E78.00 HYPERCHOLESTEREMIA: ICD-10-CM

## 2022-10-14 LAB
ABSOLUTE EOS #: 0.1 K/UL (ref 0–0.44)
ABSOLUTE IMMATURE GRANULOCYTE: 0.04 K/UL (ref 0–0.3)
ABSOLUTE LYMPH #: 1.83 K/UL (ref 1.1–3.7)
ABSOLUTE MONO #: 0.62 K/UL (ref 0.1–1.2)
ALBUMIN SERPL-MCNC: 4.7 G/DL (ref 3.5–5.2)
ALBUMIN/GLOBULIN RATIO: 1.6 (ref 1–2.5)
ALP BLD-CCNC: 68 U/L (ref 35–104)
ALT SERPL-CCNC: 30 U/L (ref 5–33)
ANION GAP SERPL CALCULATED.3IONS-SCNC: 10 MMOL/L (ref 9–17)
AST SERPL-CCNC: 32 U/L
BASOPHILS # BLD: 1 % (ref 0–2)
BASOPHILS ABSOLUTE: 0.05 K/UL (ref 0–0.2)
BILIRUB SERPL-MCNC: 1.1 MG/DL (ref 0.3–1.2)
BUN BLDV-MCNC: 8 MG/DL (ref 6–20)
CALCIUM SERPL-MCNC: 9.5 MG/DL (ref 8.6–10.4)
CHLORIDE BLD-SCNC: 103 MMOL/L (ref 98–107)
CHOLESTEROL/HDL RATIO: 3.3
CHOLESTEROL: 215 MG/DL
CO2: 27 MMOL/L (ref 20–31)
CREAT SERPL-MCNC: 0.66 MG/DL (ref 0.5–0.9)
EOSINOPHILS RELATIVE PERCENT: 1 % (ref 1–4)
GFR SERPL CREATININE-BSD FRML MDRD: >60 ML/MIN/1.73M2
GLUCOSE BLD-MCNC: 92 MG/DL (ref 70–99)
HCT VFR BLD CALC: 47.1 % (ref 36.3–47.1)
HDLC SERPL-MCNC: 66 MG/DL
HEMOGLOBIN: 15.3 G/DL (ref 11.9–15.1)
IMMATURE GRANULOCYTES: 1 %
LDL CHOLESTEROL: 122 MG/DL (ref 0–130)
LYMPHOCYTES # BLD: 23 % (ref 24–43)
MCH RBC QN AUTO: 32.6 PG (ref 25.2–33.5)
MCHC RBC AUTO-ENTMCNC: 32.5 G/DL (ref 28.4–34.8)
MCV RBC AUTO: 100.4 FL (ref 82.6–102.9)
MONOCYTES # BLD: 8 % (ref 3–12)
NRBC AUTOMATED: 0 PER 100 WBC
PDW BLD-RTO: 14.2 % (ref 11.8–14.4)
PLATELET # BLD: 231 K/UL (ref 138–453)
PMV BLD AUTO: 10.6 FL (ref 8.1–13.5)
POTASSIUM SERPL-SCNC: 5.2 MMOL/L (ref 3.7–5.3)
RBC # BLD: 4.69 M/UL (ref 3.95–5.11)
SEG NEUTROPHILS: 66 % (ref 36–65)
SEGMENTED NEUTROPHILS ABSOLUTE COUNT: 5.32 K/UL (ref 1.5–8.1)
SODIUM BLD-SCNC: 140 MMOL/L (ref 135–144)
TOTAL PROTEIN: 7.6 G/DL (ref 6.4–8.3)
TRIGL SERPL-MCNC: 137 MG/DL
TSH SERPL DL<=0.05 MIU/L-ACNC: 2.36 UIU/ML (ref 0.3–5)
VITAMIN D 25-HYDROXY: 39.9 NG/ML
WBC # BLD: 8 K/UL (ref 3.5–11.3)

## 2022-10-18 NOTE — RESULT ENCOUNTER NOTE
Notified via Contacts+t -     Your lab results were stable, we will repeat them next year or as needed. Please call the office with any questions.

## 2022-11-14 ENCOUNTER — HOSPITAL ENCOUNTER (OUTPATIENT)
Dept: WOMENS IMAGING | Age: 62
Discharge: HOME | End: 2022-11-16
Payer: MEDICARE

## 2022-11-14 DIAGNOSIS — Z12.31 ENCOUNTER FOR SCREENING MAMMOGRAM FOR BREAST CANCER: ICD-10-CM

## 2022-11-14 PROCEDURE — 77067 SCR MAMMO BI INCL CAD: CPT

## 2022-11-23 DIAGNOSIS — H10.13 ALLERGIC CONJUNCTIVITIS OF BOTH EYES: ICD-10-CM

## 2022-11-23 DIAGNOSIS — R52 GENERALIZED PAIN: ICD-10-CM

## 2022-11-23 NOTE — TELEPHONE ENCOUNTER
Last visit: 10/07/2022  Last Med refill: 08/23/2022  Does patient have enough medication for 72 hours: No:     Next Visit Date:  Future Appointments   Date Time Provider Coretta Orozco   12/21/2022 11:00 AM BETHANY Reyes  [facility] [address] [facility]   Topic Date Due    A1C test (Diabetic or Prediabetic)  04/09/2022    COVID-19 Vaccine (4 - Booster for Madrigal Evarts series) 08/23/2023 (Originally 1/31/2022)    Shingles vaccine (1 of 2) 02/23/2024 (Originally 10/23/2012)    DTaP/Tdap/Td vaccine (1 - Tdap) 02/23/2025 (Originally 10/23/1981)    Flu vaccine (1) 02/23/2025 (Originally 8/1/2022)    Pneumococcal 0-64 years Vaccine (1 - PCV) 02/23/2025 (Originally 10/23/1968)    Depression Screen  08/23/2023    Lipids  10/14/2023    Breast cancer screen  11/14/2023    Colorectal Cancer Screen  06/17/2024    Hepatitis C screen  Completed    HIV screen  Completed    Hepatitis A vaccine  Aged Out    Hib vaccine  Aged Out    Meningococcal (ACWY) vaccine  Aged Out       Hemoglobin A1C (%)   Date Value   04/09/2021 5.5   01/04/2021 5.6   12/04/2019 5.6             ( goal A1C is < 7)   No results found for: LABMICR  LDL Cholesterol (mg/dL)   Date Value   10/14/2022 122   08/16/2021 138 (H)       (goal LDL is <100)   AST (U/L)   Date Value   10/14/2022 32 (H)     ALT (U/L)   Date Value   10/14/2022 30     BUN (mg/dL)   Date Value   10/14/2022 8     BP Readings from Last 3 Encounters:   08/23/22 112/72   02/23/22 114/78   08/16/21 110/66          (goal 120/80)    All Future Testing planned in [facility]  Lab Frequency Next Occurrence               Patient Active Problem List:     Hypothyroidism     Hypercholesteremia     Allergic conjunctivitis of both eyes     Generalized pain     Other chest pain     Gastritis     Tobacco abuse     Overweight (BMI 25.0-29. 9)     Elevated fasting blood sugar     Heartburn     Chronic right-sided low back pain without sciatica

## 2022-11-25 RX ORDER — NAPROXEN 500 MG/1
TABLET ORAL
Qty: 180 TABLET | Refills: 1 | Status: SHIPPED | OUTPATIENT
Start: 2022-11-25

## 2022-11-25 RX ORDER — FLUTICASONE PROPIONATE 50 MCG
SPRAY, SUSPENSION (ML) NASAL
Qty: 16 G | Refills: 5 | Status: SHIPPED | OUTPATIENT
Start: 2022-11-25

## 2022-12-05 DIAGNOSIS — R07.89 OTHER CHEST PAIN: ICD-10-CM

## 2022-12-05 RX ORDER — AMLODIPINE BESYLATE 2.5 MG/1
TABLET ORAL
Qty: 90 TABLET | Refills: 1 | Status: SHIPPED | OUTPATIENT
Start: 2022-12-05

## 2022-12-05 NOTE — TELEPHONE ENCOUNTER
Last visit: 08/23/2022  Last Med refill: 11/20/2022  Does patient have enough medication for 72 hours: No:     Next Visit Date:  Future Appointments   Date Time Provider Coretta Orozco   12/21/2022 11:00 AM BETHANY Brunson  [facility] [address] [facility]   Topic Date Due    A1C test (Diabetic or Prediabetic)  04/09/2022    COVID-19 Vaccine (4 - Booster for [place]  series) 08/23/2023 (Originally 1/31/2022)    Shingles vaccine (1 of 2) 02/23/2024 (Originally 10/23/2012)    DTaP/Tdap/Td vaccine (1 - Tdap) 02/23/2025 (Originally 10/23/1981)    Flu vaccine (1) 02/23/2025 (Originally 8/1/2022)    Pneumococcal 0-64 years Vaccine (1 - PCV) 02/23/2025 (Originally 10/23/1968)    Depression Screen  08/23/2023    Lipids  10/14/2023    Breast cancer screen  11/14/2023    Colorectal Cancer Screen  06/17/2024    Hepatitis C screen  Completed    HIV screen  Completed    Hepatitis A vaccine  Aged Out    Hib vaccine  Aged Out    Meningococcal (ACWY) vaccine  Aged Out       Hemoglobin A1C (%)   Date Value   04/09/2021 5.5   01/04/2021 5.6   12/04/2019 5.6             ( goal A1C is < 7)   No results found for: LABMICR  LDL Cholesterol (mg/dL)   Date Value   10/14/2022 122   08/16/2021 138 (H)       (goal LDL is <100)   AST (U/L)   Date Value   10/14/2022 32 (H)     ALT (U/L)   Date Value   10/14/2022 30     BUN (mg/dL)   Date Value   10/14/2022 8     BP Readings from Last 3 Encounters:   08/23/22 112/72   02/23/22 114/78   08/16/21 110/66          (goal 120/80)    All Future Testing planned in [facility]  Lab Frequency Next Occurrence               Patient Active Problem List:     Hypothyroidism     Hypercholesteremia     Allergic conjunctivitis of both eyes     Generalized pain     Other chest pain     Gastritis     Tobacco abuse     Overweight (BMI 25.0-29. 9)     Elevated fasting blood sugar     Heartburn     Chronic right-sided low back pain without sciatica

## 2022-12-14 DIAGNOSIS — R52 GENERALIZED PAIN: ICD-10-CM

## 2022-12-14 NOTE — TELEPHONE ENCOUNTER
Last visit: 08/23/2022  Last Med refill: 10/11/2022  Does patient have enough medication for 72 hours: No:     Next Visit Date:  Future Appointments   Date Time Provider Coretta Orozco   12/21/2022 11:00 AM BETHANY Dunn  [facility] [address] [facility]   Topic Date Due    A1C test (Diabetic or Prediabetic)  04/09/2022    COVID-19 Vaccine (4 - Booster for [place] Drop series) 08/23/2023 (Originally 1/31/2022)    Shingles vaccine (1 of 2) 02/23/2024 (Originally 10/23/2012)    DTaP/Tdap/Td vaccine (1 - Tdap) 02/23/2025 (Originally 10/23/1981)    Flu vaccine (1) 02/23/2025 (Originally 8/1/2022)    Pneumococcal 0-64 years Vaccine (1 - PCV) 02/23/2025 (Originally 10/23/1966)    Depression Screen  08/23/2023    Lipids  10/14/2023    Breast cancer screen  11/14/2023    Colorectal Cancer Screen  06/17/2024    Hepatitis C screen  Completed    HIV screen  Completed    Hepatitis A vaccine  Aged Out    Hib vaccine  Aged Out    Meningococcal (ACWY) vaccine  Aged Out       Hemoglobin A1C (%)   Date Value   04/09/2021 5.5   01/04/2021 5.6   12/04/2019 5.6             ( goal A1C is < 7)   No results found for: LABMICR  LDL Cholesterol (mg/dL)   Date Value   10/14/2022 122   08/16/2021 138 (H)       (goal LDL is <100)   AST (U/L)   Date Value   10/14/2022 32 (H)     ALT (U/L)   Date Value   10/14/2022 30     BUN (mg/dL)   Date Value   10/14/2022 8     BP Readings from Last 3 Encounters:   08/23/22 112/72   02/23/22 114/78   08/16/21 110/66          (goal 120/80)    All Future Testing planned in [facility]  Lab Frequency Next Occurrence               Patient Active Problem List:     Hypothyroidism     Hypercholesteremia     Allergic conjunctivitis of both eyes     Generalized pain     Other chest pain     Gastritis     Tobacco abuse     Overweight (BMI 25.0-29. 9)     Elevated fasting blood sugar     Heartburn     Chronic right-sided low back pain without sciatica

## 2022-12-21 ENCOUNTER — OFFICE VISIT (OUTPATIENT)
Dept: FAMILY MEDICINE CLINIC | Age: 62
End: 2022-12-21
Payer: MEDICARE

## 2022-12-21 VITALS
RESPIRATION RATE: 20 BRPM | DIASTOLIC BLOOD PRESSURE: 72 MMHG | SYSTOLIC BLOOD PRESSURE: 110 MMHG | WEIGHT: 147.4 LBS | OXYGEN SATURATION: 99 % | HEIGHT: 62 IN | BODY MASS INDEX: 27.12 KG/M2 | HEART RATE: 74 BPM

## 2022-12-21 DIAGNOSIS — R00.2 PALPITATIONS: ICD-10-CM

## 2022-12-21 DIAGNOSIS — M54.50 CHRONIC RIGHT-SIDED LOW BACK PAIN WITHOUT SCIATICA: ICD-10-CM

## 2022-12-21 DIAGNOSIS — H10.13 ALLERGIC CONJUNCTIVITIS OF BOTH EYES: ICD-10-CM

## 2022-12-21 DIAGNOSIS — I10 HYPERTENSION, UNSPECIFIED TYPE: Primary | ICD-10-CM

## 2022-12-21 DIAGNOSIS — G89.29 CHRONIC RIGHT-SIDED LOW BACK PAIN WITHOUT SCIATICA: ICD-10-CM

## 2022-12-21 PROCEDURE — 3078F DIAST BP <80 MM HG: CPT | Performed by: NURSE PRACTITIONER

## 2022-12-21 PROCEDURE — G8484 FLU IMMUNIZE NO ADMIN: HCPCS | Performed by: NURSE PRACTITIONER

## 2022-12-21 PROCEDURE — G8427 DOCREV CUR MEDS BY ELIG CLIN: HCPCS | Performed by: NURSE PRACTITIONER

## 2022-12-21 PROCEDURE — 3017F COLORECTAL CA SCREEN DOC REV: CPT | Performed by: NURSE PRACTITIONER

## 2022-12-21 PROCEDURE — G8417 CALC BMI ABV UP PARAM F/U: HCPCS | Performed by: NURSE PRACTITIONER

## 2022-12-21 PROCEDURE — 99214 OFFICE O/P EST MOD 30 MIN: CPT | Performed by: NURSE PRACTITIONER

## 2022-12-21 PROCEDURE — 3074F SYST BP LT 130 MM HG: CPT | Performed by: NURSE PRACTITIONER

## 2022-12-21 PROCEDURE — 4004F PT TOBACCO SCREEN RCVD TLK: CPT | Performed by: NURSE PRACTITIONER

## 2022-12-21 RX ORDER — CETIRIZINE HYDROCHLORIDE 10 MG/1
TABLET ORAL
Qty: 90 TABLET | Refills: 1 | Status: SHIPPED | OUTPATIENT
Start: 2022-12-21

## 2022-12-21 RX ORDER — TIZANIDINE 2 MG/1
TABLET ORAL
Qty: 30 TABLET | Refills: 1 | Status: SHIPPED | OUTPATIENT
Start: 2022-12-21

## 2022-12-21 ASSESSMENT — ENCOUNTER SYMPTOMS
BACK PAIN: 1
SHORTNESS OF BREATH: 0

## 2022-12-21 NOTE — PROGRESS NOTES
Cristian Vivar (:  1960) is a 63 y.o. female,Established patient, here for evaluation of the following chief complaint(s):  6 Month Follow-Up         ASSESSMENT/PLAN:  1. Hypertension, unspecified type  2. Palpitations  3. Allergic conjunctivitis of both eyes  -     cetirizine (ZYRTEC) 10 MG tablet; take 1 tablet by mouth once daily, Disp-90 tablet, R-1Normal  4. Chronic right-sided low back pain without sciatica  -     tiZANidine (ZANAFLEX) 2 MG tablet; take 1 tablet by mouth three times a day if needed for BACK PAIN, Disp-30 tablet, R-1Normal      HTN: Well controlled. Continue norvasc 2.5mg daily. Palpitations: Start drinking 1/2 bottle of gatorade daily. Cut back on caffeine, drink more water. Call office if symptoms do not improve in 1 week. Back pain without sciatica: Well controlled with prn use of naproxen and zanaflex. Continue current medication dose and regimen. Return in about 6 months (around 2023) for physical.         Subjective   SUBJECTIVE/OBJECTIVE:  Presents  for 6 month follow up of chronic conditions    HTN: Takes BP med daily  Does not check BP at home  Denies chest pain, shortness of breath, peripheral edema   Heart palpitations x3 days  Gets this when she is dehydrated. Typically drinks half a Gatorade and they go away. Admits she has been drinking a pot of coffee a day recently. Stopped taking daily aspirin  Uses daily naproxen, worried about bleeding    Chronic back Pain: Takes zanaflex prn to manage muscle spasms  Typically works well for a few hours. Like this med because it does not make her sleepy. Would like refill today. Labs up to date  Does not want a1c checked  Denies any other problems/concerns at this time             Review of Systems   Respiratory:  Negative for shortness of breath. Cardiovascular:  Positive for palpitations. Negative for chest pain and leg swelling.    Musculoskeletal:  Positive for arthralgias (arthritis - chronic, worse with cold weather), back pain (Chronic, takes prn zanaflex) and myalgias. Negative for gait problem. Neurological:  Negative for dizziness and light-headedness. Objective   Physical Exam  Vitals and nursing note reviewed. Constitutional:       Appearance: Normal appearance. HENT:      Head: Normocephalic. Right Ear: Hearing normal.      Left Ear: Hearing normal.      Nose: Nose normal.      Mouth/Throat:      Mouth: Mucous membranes are moist.      Pharynx: Oropharynx is clear. Eyes:      Extraocular Movements: Extraocular movements intact. Conjunctiva/sclera: Conjunctivae normal.      Pupils: Pupils are equal, round, and reactive to light. Cardiovascular:      Rate and Rhythm: Normal rate and regular rhythm. Pulses: Normal pulses. Heart sounds: Normal heart sounds. Pulmonary:      Effort: Pulmonary effort is normal.      Breath sounds: Normal breath sounds. Abdominal:      General: Abdomen is flat. Bowel sounds are normal.      Palpations: Abdomen is soft. Musculoskeletal:         General: Normal range of motion. Cervical back: Normal range of motion. Skin:     General: Skin is warm and dry. Capillary Refill: Capillary refill takes less than 2 seconds. Neurological:      General: No focal deficit present. Mental Status: She is alert and oriented to person, place, and time. Mental status is at baseline. Psychiatric:         Mood and Affect: Mood normal.         Behavior: Behavior normal.         Thought Content:  Thought content normal.         Judgment: Judgment normal.              Wt Readings from Last 3 Encounters:   12/21/22 147 lb 6.4 oz (66.9 kg)   08/23/22 149 lb 9.6 oz (67.9 kg)   02/23/22 146 lb (66.2 kg)     Temp Readings from Last 3 Encounters:   08/23/22 97.1 °F (36.2 °C)   02/23/22 98.1 °F (36.7 °C) (Temporal)   08/16/21 97.4 °F (36.3 °C) (Temporal)     BP Readings from Last 3 Encounters:   12/21/22 110/72   08/23/22 112/72   02/23/22 114/78 Pulse Readings from Last 3 Encounters:   12/21/22 74   08/23/22 76   02/23/22 75           An electronic signature was used to authenticate this note.     --Caitlyn Mar, APRLALO - NP

## 2023-01-04 DIAGNOSIS — J30.1 SEASONAL ALLERGIC RHINITIS DUE TO POLLEN: ICD-10-CM

## 2023-01-04 RX ORDER — MONTELUKAST SODIUM 10 MG/1
10 TABLET ORAL NIGHTLY
Qty: 90 TABLET | Refills: 1 | Status: SHIPPED | OUTPATIENT
Start: 2023-01-04

## 2023-03-16 DIAGNOSIS — R07.89 OTHER CHEST PAIN: ICD-10-CM

## 2023-03-16 DIAGNOSIS — E78.00 HYPERCHOLESTEREMIA: ICD-10-CM

## 2023-03-16 DIAGNOSIS — R12 HEARTBURN: ICD-10-CM

## 2023-03-16 DIAGNOSIS — E03.9 HYPOTHYROIDISM, UNSPECIFIED TYPE: Chronic | ICD-10-CM

## 2023-03-16 NOTE — TELEPHONE ENCOUNTER
Last visit: 12/21/22  Last Med refill: 03/07/23  Does patient have enough medication for 72 hours: Yes    Next Visit Date:  Future Appointments   Date Time Provider Coretta Pam   6/22/2023 10:30 AM Nicole Acosta  [facility] [address] [facility]   Topic Date Due    A1C test (Diabetic or Prediabetic)  04/09/2022    COVID-19 Vaccine (4 - Booster for Moderna series) 08/23/2023 (Originally 1/31/2022)    Shingles vaccine (1 of 2) 02/23/2024 (Originally 10/23/2012)    DTaP/Tdap/Td vaccine (1 - Tdap) 02/23/2025 (Originally 10/23/1981)    Flu vaccine (1) 02/23/2025 (Originally 8/1/2022)    Pneumococcal 0-64 years Vaccine (1 - PCV) 02/23/2025 (Originally 10/23/1968)    Depression Screen  08/23/2023    Lipids  10/14/2023    Breast cancer screen  11/14/2023    Colorectal Cancer Screen  06/17/2024    Hepatitis C screen  Completed    HIV screen  Completed    Hepatitis A vaccine  Aged Out    Hib vaccine  Aged Out    Meningococcal (ACWY) vaccine  Aged Out       Hemoglobin A1C (%)   Date Value   04/09/2021 5.5   01/04/2021 5.6   12/04/2019 5.6             ( goal A1C is < 7)   No results found for: LABMICR  LDL Cholesterol (mg/dL)   Date Value   10/14/2022 122   08/16/2021 138 (H)       (goal LDL is <100)   AST (U/L)   Date Value   10/14/2022 32 (H)     ALT (U/L)   Date Value   10/14/2022 30     BUN (mg/dL)   Date Value   10/14/2022 8     BP Readings from Last 3 Encounters:   12/21/22 110/72   08/23/22 112/72   02/23/22 114/78          (goal 120/80)    All Future Testing planned in [facility]  Lab Frequency Next Occurrence               Patient Active Problem List:     Hypothyroidism     Hypercholesteremia     Allergic conjunctivitis of both eyes     Generalized pain     Other chest pain     Gastritis     Tobacco abuse     Overweight (BMI 25.0-29. 9)     Elevated fasting blood sugar     Heartburn     Chronic right-sided low back pain without sciatica

## 2023-03-17 RX ORDER — FAMOTIDINE 20 MG/1
TABLET, FILM COATED ORAL
Qty: 180 TABLET | Refills: 1 | Status: SHIPPED | OUTPATIENT
Start: 2023-03-17

## 2023-03-17 RX ORDER — LEVOTHYROXINE SODIUM 88 UG/1
TABLET ORAL
Qty: 90 TABLET | Refills: 1 | Status: SHIPPED | OUTPATIENT
Start: 2023-03-17

## 2023-03-17 RX ORDER — ATORVASTATIN CALCIUM 10 MG/1
TABLET, FILM COATED ORAL
Qty: 90 TABLET | Refills: 1 | Status: SHIPPED | OUTPATIENT
Start: 2023-03-17

## 2023-03-17 RX ORDER — AMLODIPINE BESYLATE 2.5 MG/1
TABLET ORAL
Qty: 90 TABLET | Refills: 1 | Status: SHIPPED | OUTPATIENT
Start: 2023-03-17

## 2023-05-15 DIAGNOSIS — J30.1 SEASONAL ALLERGIC RHINITIS DUE TO POLLEN: ICD-10-CM

## 2023-05-15 DIAGNOSIS — H10.13 ALLERGIC CONJUNCTIVITIS OF BOTH EYES: ICD-10-CM

## 2023-05-15 DIAGNOSIS — R52 GENERALIZED PAIN: ICD-10-CM

## 2023-05-15 RX ORDER — CETIRIZINE HYDROCHLORIDE 10 MG/1
TABLET ORAL
Qty: 90 TABLET | Refills: 1 | Status: SHIPPED | OUTPATIENT
Start: 2023-05-15

## 2023-05-15 RX ORDER — NAPROXEN 500 MG/1
TABLET ORAL
Qty: 180 TABLET | Refills: 1 | Status: SHIPPED | OUTPATIENT
Start: 2023-05-15

## 2023-05-15 RX ORDER — MONTELUKAST SODIUM 10 MG/1
10 TABLET ORAL NIGHTLY
Qty: 90 TABLET | Refills: 1 | Status: SHIPPED | OUTPATIENT
Start: 2023-05-15

## 2023-05-15 NOTE — TELEPHONE ENCOUNTER
Last visit: 12/21/2022  Last Med refill: 04/06/2023  Does patient have enough medication for 72 hours: No:     Next Visit Date:  Future Appointments   Date Time Provider Coretta Orozco   6/22/2023 10:30 AM Nicole Gr  [facility] [address] [facility]   Topic Date Due    A1C test (Diabetic or Prediabetic)  04/09/2022    COVID-19 Vaccine (4 - Booster for Moderna series) 08/23/2023 (Originally 1/31/2022)    Shingles vaccine (1 of 2) 02/23/2024 (Originally 10/23/2012)    DTaP/Tdap/Td vaccine (1 - Tdap) 02/23/2025 (Originally 10/23/1981)    Flu vaccine (Season Ended) 02/23/2025 (Originally 8/1/2023)    Pneumococcal 0-64 years Vaccine (1 - PCV) 02/23/2025 (Originally 10/23/1968)    Depression Screen  08/23/2023    Lipids  10/14/2023    Breast cancer screen  11/14/2023    Colorectal Cancer Screen  06/17/2024    Hepatitis C screen  Completed    HIV screen  Completed    Hepatitis A vaccine  Aged Out    Hib vaccine  Aged Out    Meningococcal (ACWY) vaccine  Aged Out    Diabetes screen  Discontinued    Cervical cancer screen  Discontinued       Hemoglobin A1C (%)   Date Value   04/09/2021 5.5   01/04/2021 5.6   12/04/2019 5.6             ( goal A1C is < 7)   No results found for: LABMICR  LDL Cholesterol (mg/dL)   Date Value   10/14/2022 122   08/16/2021 138 (H)       (goal LDL is <100)   AST (U/L)   Date Value   10/14/2022 32 (H)     ALT (U/L)   Date Value   10/14/2022 30     BUN (mg/dL)   Date Value   10/14/2022 8     BP Readings from Last 3 Encounters:   12/21/22 110/72   08/23/22 112/72   02/23/22 114/78          (goal 120/80)    All Future Testing planned in [facility]  Lab Frequency Next Occurrence               Patient Active Problem List:     Hypothyroidism     Hypercholesteremia     Allergic conjunctivitis of both eyes     Generalized pain     Other chest pain     Gastritis     Tobacco abuse     Overweight (BMI 25.0-29. 9)     Elevated fasting blood sugar     Heartburn     Chronic right-sided low

## 2023-05-15 NOTE — TELEPHONE ENCOUNTER
Last visit: 12/21/2022  Last Med refill: 04/06/2023  Does patient have enough medication for 72 hours: No:     Next Visit Date:  Future Appointments   Date Time Provider Coretta Pam   6/22/2023 10:30 AM Nicole Forrest  [facility] [address] [facility]   Topic Date Due    A1C test (Diabetic or Prediabetic)  04/09/2022    COVID-19 Vaccine (4 - Booster for Moderna series) 08/23/2023 (Originally 1/31/2022)    Shingles vaccine (1 of 2) 02/23/2024 (Originally 10/23/2012)    DTaP/Tdap/Td vaccine (1 - Tdap) 02/23/2025 (Originally 10/23/1981)    Flu vaccine (Season Ended) 02/23/2025 (Originally 8/1/2023)    Pneumococcal 0-64 years Vaccine (1 - PCV) 02/23/2025 (Originally 10/23/1966)    Depression Screen  08/23/2023    Lipids  10/14/2023    Breast cancer screen  11/14/2023    Colorectal Cancer Screen  06/17/2024    Hepatitis C screen  Completed    HIV screen  Completed    Hepatitis A vaccine  Aged Out    Hib vaccine  Aged Out    Meningococcal (ACWY) vaccine  Aged Out    Diabetes screen  Discontinued    Cervical cancer screen  Discontinued       Hemoglobin A1C (%)   Date Value   04/09/2021 5.5   01/04/2021 5.6   12/04/2019 5.6             ( goal A1C is < 7)   No results found for: LABMICR  LDL Cholesterol (mg/dL)   Date Value   10/14/2022 122   08/16/2021 138 (H)       (goal LDL is <100)   AST (U/L)   Date Value   10/14/2022 32 (H)     ALT (U/L)   Date Value   10/14/2022 30     BUN (mg/dL)   Date Value   10/14/2022 8     BP Readings from Last 3 Encounters:   12/21/22 110/72   08/23/22 112/72   02/23/22 114/78          (goal 120/80)    All Future Testing planned in [facility]  Lab Frequency Next Occurrence               Patient Active Problem List:     Hypothyroidism     Hypercholesteremia     Allergic conjunctivitis of both eyes     Generalized pain     Other chest pain     Gastritis     Tobacco abuse     Overweight (BMI 25.0-29. 9)     Elevated fasting blood sugar     Heartburn     Chronic right-sided low

## 2023-06-23 ENCOUNTER — OFFICE VISIT (OUTPATIENT)
Dept: FAMILY MEDICINE CLINIC | Age: 63
End: 2023-06-23
Payer: COMMERCIAL

## 2023-06-23 VITALS
SYSTOLIC BLOOD PRESSURE: 120 MMHG | HEART RATE: 68 BPM | BODY MASS INDEX: 26.36 KG/M2 | TEMPERATURE: 97.2 F | DIASTOLIC BLOOD PRESSURE: 82 MMHG | OXYGEN SATURATION: 97 % | WEIGHT: 139.6 LBS | HEIGHT: 61 IN

## 2023-06-23 DIAGNOSIS — G89.29 CHRONIC RIGHT-SIDED LOW BACK PAIN WITHOUT SCIATICA: ICD-10-CM

## 2023-06-23 DIAGNOSIS — R51.9 NONINTRACTABLE EPISODIC HEADACHE, UNSPECIFIED HEADACHE TYPE: ICD-10-CM

## 2023-06-23 DIAGNOSIS — E03.9 HYPOTHYROIDISM, UNSPECIFIED TYPE: ICD-10-CM

## 2023-06-23 DIAGNOSIS — R06.02 SHORTNESS OF BREATH: ICD-10-CM

## 2023-06-23 DIAGNOSIS — Z00.01 ENCOUNTER FOR ANNUAL GENERAL MEDICAL EXAMINATION WITH ABNORMAL FINDINGS IN ADULT: Primary | ICD-10-CM

## 2023-06-23 DIAGNOSIS — I10 HYPERTENSION, UNSPECIFIED TYPE: ICD-10-CM

## 2023-06-23 DIAGNOSIS — E78.00 HYPERCHOLESTEREMIA: ICD-10-CM

## 2023-06-23 DIAGNOSIS — M54.50 CHRONIC RIGHT-SIDED LOW BACK PAIN WITHOUT SCIATICA: ICD-10-CM

## 2023-06-23 PROCEDURE — 3079F DIAST BP 80-89 MM HG: CPT | Performed by: NURSE PRACTITIONER

## 2023-06-23 PROCEDURE — 99396 PREV VISIT EST AGE 40-64: CPT | Performed by: NURSE PRACTITIONER

## 2023-06-23 PROCEDURE — 3074F SYST BP LT 130 MM HG: CPT | Performed by: NURSE PRACTITIONER

## 2023-06-23 RX ORDER — TIZANIDINE 2 MG/1
TABLET ORAL
Qty: 30 TABLET | Refills: 1 | Status: SHIPPED | OUTPATIENT
Start: 2023-06-23

## 2023-06-23 RX ORDER — ALBUTEROL SULFATE 90 UG/1
2 AEROSOL, METERED RESPIRATORY (INHALATION) EVERY 6 HOURS PRN
Qty: 18 G | Refills: 3 | Status: SHIPPED | OUTPATIENT
Start: 2023-06-23

## 2023-06-23 SDOH — ECONOMIC STABILITY: INCOME INSECURITY: HOW HARD IS IT FOR YOU TO PAY FOR THE VERY BASICS LIKE FOOD, HOUSING, MEDICAL CARE, AND HEATING?: NOT VERY HARD

## 2023-06-23 SDOH — ECONOMIC STABILITY: HOUSING INSECURITY
IN THE LAST 12 MONTHS, WAS THERE A TIME WHEN YOU DID NOT HAVE A STEADY PLACE TO SLEEP OR SLEPT IN A SHELTER (INCLUDING NOW)?: NO

## 2023-06-23 SDOH — ECONOMIC STABILITY: FOOD INSECURITY: WITHIN THE PAST 12 MONTHS, THE FOOD YOU BOUGHT JUST DIDN'T LAST AND YOU DIDN'T HAVE MONEY TO GET MORE.: NEVER TRUE

## 2023-06-23 SDOH — ECONOMIC STABILITY: FOOD INSECURITY: WITHIN THE PAST 12 MONTHS, YOU WORRIED THAT YOUR FOOD WOULD RUN OUT BEFORE YOU GOT MONEY TO BUY MORE.: NEVER TRUE

## 2023-06-23 ASSESSMENT — ENCOUNTER SYMPTOMS
WHEEZING: 0
COLOR CHANGE: 0
SINUS PAIN: 0
ABDOMINAL PAIN: 0
VOMITING: 0
CONSTIPATION: 0
DIARRHEA: 0
CHEST TIGHTNESS: 0
BACK PAIN: 0
NAUSEA: 0
SINUS PRESSURE: 0
EYE PAIN: 0
SHORTNESS OF BREATH: 1

## 2023-06-23 ASSESSMENT — PATIENT HEALTH QUESTIONNAIRE - PHQ9
SUM OF ALL RESPONSES TO PHQ QUESTIONS 1-9: 0
2. FEELING DOWN, DEPRESSED OR HOPELESS: 0
SUM OF ALL RESPONSES TO PHQ9 QUESTIONS 1 & 2: 0
SUM OF ALL RESPONSES TO PHQ QUESTIONS 1-9: 0
SUM OF ALL RESPONSES TO PHQ QUESTIONS 1-9: 0
1. LITTLE INTEREST OR PLEASURE IN DOING THINGS: 0
SUM OF ALL RESPONSES TO PHQ QUESTIONS 1-9: 0

## 2023-06-23 NOTE — PROGRESS NOTES
Pt is here today for a physical     Pt refused A1c
Normal pulses. Heart sounds: Normal heart sounds, S1 normal and S2 normal.   Pulmonary:      Effort: Pulmonary effort is normal.      Breath sounds: Normal breath sounds and air entry. No decreased breath sounds, wheezing, rhonchi or rales. Abdominal:      General: Abdomen is flat. Bowel sounds are normal.      Palpations: Abdomen is soft. Musculoskeletal:         General: Normal range of motion. Cervical back: Normal range of motion. Skin:     General: Skin is warm and dry. Capillary Refill: Capillary refill takes less than 2 seconds. Neurological:      General: No focal deficit present. Mental Status: She is alert and oriented to person, place, and time. Mental status is at baseline. Psychiatric:         Mood and Affect: Mood normal.         Behavior: Behavior normal.         Thought Content: Thought content normal.         Judgment: Judgment normal.              Wt Readings from Last 3 Encounters:   06/23/23 139 lb 9.6 oz (63.3 kg)   12/21/22 147 lb 6.4 oz (66.9 kg)   08/23/22 149 lb 9.6 oz (67.9 kg)     Temp Readings from Last 3 Encounters:   06/23/23 97.2 °F (36.2 °C)   08/23/22 97.1 °F (36.2 °C)   02/23/22 98.1 °F (36.7 °C) (Temporal)     BP Readings from Last 3 Encounters:   06/23/23 120/82   12/21/22 110/72   08/23/22 112/72     Pulse Readings from Last 3 Encounters:   06/23/23 68   12/21/22 74   08/23/22 76           An electronic signature was used to authenticate this note.     --BETHANY Toledo NP

## 2023-06-29 ENCOUNTER — HOSPITAL ENCOUNTER (OUTPATIENT)
Age: 63
Setting detail: SPECIMEN
Discharge: HOME | End: 2023-06-29

## 2023-06-29 DIAGNOSIS — I10 HYPERTENSION, UNSPECIFIED TYPE: ICD-10-CM

## 2023-06-29 DIAGNOSIS — E78.00 HYPERCHOLESTEREMIA: ICD-10-CM

## 2023-06-29 DIAGNOSIS — E03.9 HYPOTHYROIDISM, UNSPECIFIED TYPE: ICD-10-CM

## 2023-06-29 LAB
ALBUMIN SERPL-MCNC: 4.4 G/DL (ref 3.5–5.2)
ALBUMIN/GLOB SERPL: 1.5 {RATIO} (ref 1–2.5)
ALP SERPL-CCNC: 64 U/L (ref 35–104)
ALT SERPL-CCNC: 19 U/L (ref 5–33)
ANION GAP SERPL CALCULATED.3IONS-SCNC: 14 MMOL/L (ref 9–17)
AST SERPL-CCNC: 22 U/L
BASOPHILS # BLD: 0.06 K/UL (ref 0–0.2)
BASOPHILS NFR BLD: 1 % (ref 0–2)
BILIRUB SERPL-MCNC: 1 MG/DL (ref 0.3–1.2)
BUN SERPL-MCNC: 9 MG/DL (ref 8–23)
CALCIUM SERPL-MCNC: 9.6 MG/DL (ref 8.6–10.4)
CHLORIDE SERPL-SCNC: 100 MMOL/L (ref 98–107)
CHOLEST SERPL-MCNC: 214 MG/DL
CHOLESTEROL/HDL RATIO: 3.6
CO2 SERPL-SCNC: 23 MMOL/L (ref 20–31)
CREAT SERPL-MCNC: 0.63 MG/DL (ref 0.5–0.9)
EOSINOPHIL # BLD: 0.07 K/UL (ref 0–0.44)
EOSINOPHILS RELATIVE PERCENT: 1 % (ref 1–4)
ERYTHROCYTE [DISTWIDTH] IN BLOOD BY AUTOMATED COUNT: 13.9 % (ref 11.8–14.4)
GFR SERPL CREATININE-BSD FRML MDRD: >60 ML/MIN/1.73M2
GLUCOSE SERPL-MCNC: 86 MG/DL (ref 70–99)
HCT VFR BLD AUTO: 48.9 % (ref 36.3–47.1)
HDLC SERPL-MCNC: 60 MG/DL
HGB BLD-MCNC: 16.3 G/DL (ref 11.9–15.1)
IMM GRANULOCYTES # BLD AUTO: 0.04 K/UL (ref 0–0.3)
IMM GRANULOCYTES NFR BLD: 1 %
LDLC SERPL CALC-MCNC: 128 MG/DL (ref 0–130)
LYMPHOCYTES # BLD: 23 % (ref 24–43)
LYMPHOCYTES NFR BLD: 1.74 K/UL (ref 1.1–3.7)
MCH RBC QN AUTO: 33.2 PG (ref 25.2–33.5)
MCHC RBC AUTO-ENTMCNC: 33.3 G/DL (ref 28.4–34.8)
MCV RBC AUTO: 99.6 FL (ref 82.6–102.9)
MONOCYTES NFR BLD: 0.68 K/UL (ref 0.1–1.2)
MONOCYTES NFR BLD: 9 % (ref 3–12)
NEUTROPHILS NFR BLD: 65 % (ref 36–65)
NEUTS SEG NFR BLD: 5 K/UL (ref 1.5–8.1)
NRBC BLD-RTO: 0 PER 100 WBC
PLATELET # BLD AUTO: 214 K/UL (ref 138–453)
PMV BLD AUTO: 10.2 FL (ref 8.1–13.5)
POTASSIUM SERPL-SCNC: 5 MMOL/L (ref 3.7–5.3)
PROT SERPL-MCNC: 7.3 G/DL (ref 6.4–8.3)
RBC # BLD AUTO: 4.91 M/UL (ref 3.95–5.11)
SODIUM SERPL-SCNC: 137 MMOL/L (ref 135–144)
TRIGL SERPL-MCNC: 129 MG/DL
TSH SERPL DL<=0.05 MIU/L-ACNC: 1.5 UIU/ML (ref 0.3–5)
WBC OTHER # BLD: 7.6 K/UL (ref 3.5–11.3)

## 2023-07-03 DIAGNOSIS — D58.2 ELEVATED HEMOGLOBIN (HCC): Primary | ICD-10-CM

## 2023-07-31 ENCOUNTER — HOSPITAL ENCOUNTER (OUTPATIENT)
Age: 63
Setting detail: SPECIMEN
Discharge: HOME | End: 2023-07-31

## 2023-07-31 DIAGNOSIS — D58.2 ELEVATED HEMOGLOBIN (HCC): ICD-10-CM

## 2023-07-31 LAB
BASOPHILS # BLD: 0.08 K/UL (ref 0–0.2)
BASOPHILS NFR BLD: 1 % (ref 0–2)
EOSINOPHIL # BLD: 0.09 K/UL (ref 0–0.44)
EOSINOPHILS RELATIVE PERCENT: 1 % (ref 1–4)
ERYTHROCYTE [DISTWIDTH] IN BLOOD BY AUTOMATED COUNT: 14.2 % (ref 11.8–14.4)
HCT VFR BLD AUTO: 49.2 % (ref 36.3–47.1)
HGB BLD-MCNC: 16.2 G/DL (ref 11.9–15.1)
IMM GRANULOCYTES # BLD AUTO: <0.03 K/UL (ref 0–0.3)
IMM GRANULOCYTES NFR BLD: 0 %
LYMPHOCYTES NFR BLD: 1.63 K/UL (ref 1.1–3.7)
LYMPHOCYTES RELATIVE PERCENT: 24 % (ref 24–43)
MCH RBC QN AUTO: 32.5 PG (ref 25.2–33.5)
MCHC RBC AUTO-ENTMCNC: 32.9 G/DL (ref 28.4–34.8)
MCV RBC AUTO: 98.6 FL (ref 82.6–102.9)
MONOCYTES NFR BLD: 0.57 K/UL (ref 0.1–1.2)
MONOCYTES NFR BLD: 8 % (ref 3–12)
NEUTROPHILS NFR BLD: 66 % (ref 36–65)
NEUTS SEG NFR BLD: 4.45 K/UL (ref 1.5–8.1)
NRBC BLD-RTO: 0 PER 100 WBC
PLATELET # BLD AUTO: 237 K/UL (ref 138–453)
PMV BLD AUTO: 10.3 FL (ref 8.1–13.5)
RBC # BLD AUTO: 4.99 M/UL (ref 3.95–5.11)
WBC OTHER # BLD: 6.8 K/UL (ref 3.5–11.3)

## 2023-08-04 DIAGNOSIS — Z13.0 SCREENING, ANEMIA, DEFICIENCY, IRON: Primary | ICD-10-CM

## 2023-08-07 ENCOUNTER — TELEPHONE (OUTPATIENT)
Dept: FAMILY MEDICINE CLINIC | Age: 63
End: 2023-08-07

## 2023-08-07 ENCOUNTER — TELEPHONE (OUTPATIENT)
Dept: ONCOLOGY | Age: 63
End: 2023-08-07

## 2023-08-07 DIAGNOSIS — Z13.0 SCREENING FOR IRON DEFICIENCY ANEMIA: Primary | ICD-10-CM

## 2023-08-07 NOTE — TELEPHONE ENCOUNTER
Received a call from [address] [place] [address] stating they do not accept patient's insurance. Informed patient and advised her to contact insurance and get a list of different providers. Informed her we can put in a new referral after that. Patient verbalized understanding. States she will let us know.

## 2023-08-07 NOTE — TELEPHONE ENCOUNTER
RECEIVED AN INTERNAL REFERRAL FOR PT. PT HAS [employer] MEDICAID (WE DO NOT TAKE). WRITER CALLED THE REFERRING OFFICE & NOTIFIED Griselda Esquivel. REFERRAL SENT BACK TO OFFICE.

## 2023-08-08 ENCOUNTER — TELEPHONE (OUTPATIENT)
Dept: FAMILY MEDICINE CLINIC | Age: 63
End: 2023-08-08

## 2023-08-08 DIAGNOSIS — H10.13 ALLERGIC CONJUNCTIVITIS OF BOTH EYES: ICD-10-CM

## 2023-08-08 RX ORDER — CETIRIZINE HYDROCHLORIDE 10 MG/1
TABLET ORAL
Qty: 90 TABLET | Refills: 1 | OUTPATIENT
Start: 2023-08-08

## 2023-08-08 NOTE — TELEPHONE ENCOUNTER
Patient contacted office asking for new hematology referral to go to Dr. Anu Farias.     Referral faxed per request  FX: [phone]

## 2023-09-01 DIAGNOSIS — E03.9 HYPOTHYROIDISM, UNSPECIFIED TYPE: Chronic | ICD-10-CM

## 2023-09-01 NOTE — TELEPHONE ENCOUNTER
Last Visit:  8/23/2022     Next Visit Date:  Future Appointments   Date Time Provider [address]   12/21/2023  1:00 PM Nicole Haywood MD [facility]

## 2023-09-05 RX ORDER — LEVOTHYROXINE SODIUM 88 UG/1
TABLET ORAL
Qty: 90 TABLET | Refills: 1 | Status: SHIPPED | OUTPATIENT
Start: 2023-09-05

## 2023-09-09 DIAGNOSIS — R07.89 OTHER CHEST PAIN: ICD-10-CM

## 2023-09-11 RX ORDER — AMLODIPINE BESYLATE 2.5 MG/1
TABLET ORAL
Qty: 90 TABLET | Refills: 1 | Status: SHIPPED | OUTPATIENT
Start: 2023-09-11

## 2023-09-11 NOTE — TELEPHONE ENCOUNTER
Last visit: 6/23/23  Last Med refill: 3/17/23  Does patient have enough medication for 72 hours: Yes    Next Visit Date:  Future Appointments   Date Time Provider [address]   12/21/2023  1:00 PM Nicole Arellano MD [address] Maintenance   Topic Date Due    COVID-19 Vaccine (4 - Moderna series) 01/31/2022    A1C test (Diabetic or Prediabetic)  04/09/2022    Shingles vaccine (1 of 2) 02/23/2024 (Originally 10/23/2012)    DTaP/Tdap/Td vaccine (1 - Tdap) 02/23/2025 (Originally 10/23/1981)    Flu vaccine (1) 02/23/2025 (Originally 8/1/2023)    Pneumococcal 0-64 years Vaccine (1 - PCV) 02/23/2025 (Originally [birth year]/23/1966)    Breast cancer screen  11/14/2023    Colorectal Cancer Screen  06/17/2024    Depression Screen  06/23/2024    Lipids  06/29/2024    Hepatitis C screen  Completed    HIV screen  Completed    Hepatitis A vaccine  Aged Out    Hepatitis B vaccine  Aged Out    Hib vaccine  Aged Out    Meningococcal (ACWY) vaccine  Aged Out    Diabetes screen  Discontinued    Cervical cancer screen  Discontinued       Hemoglobin A1C (%)   Date Value   04/09/2021 5.5   01/04/2021 5.6   12/04/2019 5.6             ( goal A1C is < 7)   No components found for: \"LABMICR\"  LDL Cholesterol (mg/dL)   Date Value   06/29/2023 128   10/14/2022 122       (goal LDL is <100)   AST (U/L)   Date Value   06/29/2023 22     ALT (U/L)   Date Value   06/29/2023 19     BUN (mg/dL)   Date Value   06/29/2023 9     BP Readings from Last 3 Encounters:   06/23/23 120/82   12/21/22 110/72   08/23/22 112/72          (goal 120/80)    All Future Testing planned in [facility]  Lab Frequency Next Occurrence               Patient Active Problem List:     Hypothyroidism     Hypercholesteremia     Allergic conjunctivitis of both eyes     Generalized pain     Other chest pain     Gastritis     Tobacco abuse     Overweight (BMI 25.0-29. 9)     Elevated fasting blood sugar     Heartburn     Chronic right-sided low back pain without

## 2023-09-21 ENCOUNTER — TELEPHONE (OUTPATIENT)
Dept: FAMILY MEDICINE CLINIC | Age: 63
End: 2023-09-21

## 2023-09-21 DIAGNOSIS — Z12.31 ENCOUNTER FOR SCREENING MAMMOGRAM FOR MALIGNANT NEOPLASM OF BREAST: Primary | ICD-10-CM

## 2023-10-13 DIAGNOSIS — H10.13 ALLERGIC CONJUNCTIVITIS OF BOTH EYES: ICD-10-CM

## 2023-10-13 NOTE — TELEPHONE ENCOUNTER
Last visit: 06/23/2023  Last Med refill: 10/03/2023  Does patient have enough medication for 72 hours: No:     Next Visit Date:  Future Appointments   Date Time Provider [address]   12/21/2023  1:00 PM Sadia Robb MD [address] Maintenance   Topic Date Due    COVID-19 Vaccine (4 - Moderna series) 01/31/2022    A1C test (Diabetic or Prediabetic)  04/09/2022    Shingles vaccine (1 of 2) 02/23/2024 (Originally 10/23/2012)    DTaP/Tdap/Td vaccine (1 - Tdap) 02/23/2025 (Originally 10/23/1981)    Flu vaccine (1) 02/23/2025 (Originally 8/1/2023)    Pneumococcal 0-64 years Vaccine (1 - PCV) 02/23/2025 (Originally 10/23/1968)    Breast cancer screen  11/14/2023    Colorectal Cancer Screen  06/17/2024    Depression Screen  06/23/2024    Lipids  06/29/2024    Hepatitis C screen  Completed    HIV screen  Completed    Hepatitis A vaccine  Aged Out    Hepatitis B vaccine  Aged Out    Hib vaccine  Aged Out    Meningococcal (ACWY) vaccine  Aged Out    Diabetes screen  Discontinued    Cervical cancer screen  Discontinued       Hemoglobin A1C (%)   Date Value   04/09/2021 5.5   01/04/2021 5.6   12/04/2019 5.6             ( goal A1C is < 7)   No components found for: \"LABMICR\"  LDL Cholesterol (mg/dL)   Date Value   06/29/2023 128   10/14/2022 122       (goal LDL is <100)   AST (U/L)   Date Value   06/29/2023 22     ALT (U/L)   Date Value   06/29/2023 19     BUN (mg/dL)   Date Value   06/29/2023 9     BP Readings from Last 3 Encounters:   06/23/23 120/82   12/21/22 110/72   08/23/22 112/72          (goal 120/80)    All Future Testing planned in [facility]  Lab Frequency Next Occurrence   CHARLIE DIGITAL SCREEN W OR WO CAD BILATERAL Once 09/21/2023               Patient Active Problem List:     Hypothyroidism     Hypercholesteremia     Allergic conjunctivitis of both eyes     Generalized pain     Other chest pain     Gastritis     Tobacco abuse     Overweight (BMI 25.0-29. 9)     Elevated fasting blood

## 2023-10-16 RX ORDER — FLUTICASONE PROPIONATE 50 MCG
SPRAY, SUSPENSION (ML) NASAL
Qty: 16 G | Refills: 5 | Status: SHIPPED | OUTPATIENT
Start: 2023-10-16

## 2023-11-10 ENCOUNTER — TELEPHONE (OUTPATIENT)
Dept: FAMILY MEDICINE CLINIC | Age: 63
End: 2023-11-10

## 2023-11-10 DIAGNOSIS — Z12.31 ENCOUNTER FOR SCREENING MAMMOGRAM FOR MALIGNANT NEOPLASM OF BREAST: ICD-10-CM

## 2023-11-10 DIAGNOSIS — Z12.31 ENCOUNTER FOR SCREENING MAMMOGRAM FOR BREAST CANCER: Primary | ICD-10-CM

## 2023-11-10 NOTE — TELEPHONE ENCOUNTER
Received fax from [phone] stating pt needs a diagnostic mamm for RT breast     Order pended if allowed

## 2023-11-30 DIAGNOSIS — R07.89 OTHER CHEST PAIN: ICD-10-CM

## 2023-11-30 RX ORDER — AMLODIPINE BESYLATE 2.5 MG/1
TABLET ORAL
Qty: 90 TABLET | Refills: 1 | OUTPATIENT
Start: 2023-11-30

## 2024-01-24 DIAGNOSIS — G89.29 CHRONIC RIGHT-SIDED LOW BACK PAIN WITHOUT SCIATICA: ICD-10-CM

## 2024-01-24 DIAGNOSIS — M54.50 CHRONIC RIGHT-SIDED LOW BACK PAIN WITHOUT SCIATICA: ICD-10-CM

## 2024-01-24 RX ORDER — TIZANIDINE 2 MG/1
TABLET ORAL
Qty: 30 TABLET | Refills: 1 | Status: SHIPPED | OUTPATIENT
Start: 2024-01-24

## 2024-01-24 NOTE — TELEPHONE ENCOUNTER
Last Visit:  12/21/2023     Next Visit Date:  Future Appointments   Date Time Provider Department Center   6/24/2024  1:00 PM Nicole Meade MD [facility]

## 2024-02-15 ENCOUNTER — HOSPITAL ENCOUNTER (OUTPATIENT)
Dept: PULMONOLOGY | Age: 64
Discharge: HOME | End: 2024-02-15
Payer: COMMERCIAL

## 2024-02-15 DIAGNOSIS — R06.09 DYSPNEA ON EXERTION: ICD-10-CM

## 2024-02-15 PROCEDURE — 94060 EVALUATION OF WHEEZING: CPT

## 2024-02-15 PROCEDURE — 94640 AIRWAY INHALATION TREATMENT: CPT

## 2024-02-15 PROCEDURE — 94664 DEMO&/EVAL PT USE INHALER: CPT

## 2024-02-15 PROCEDURE — 94726 PLETHYSMOGRAPHY LUNG VOLUMES: CPT

## 2024-02-15 PROCEDURE — 94729 DIFFUSING CAPACITY: CPT

## 2024-02-26 DIAGNOSIS — E03.9 HYPOTHYROIDISM, UNSPECIFIED TYPE: Chronic | ICD-10-CM

## 2024-02-26 DIAGNOSIS — M54.50 CHRONIC RIGHT-SIDED LOW BACK PAIN WITHOUT SCIATICA: ICD-10-CM

## 2024-02-26 DIAGNOSIS — G89.29 CHRONIC RIGHT-SIDED LOW BACK PAIN WITHOUT SCIATICA: ICD-10-CM

## 2024-02-26 RX ORDER — TIZANIDINE 2 MG/1
TABLET ORAL
Qty: 30 TABLET | Refills: 1 | OUTPATIENT
Start: 2024-02-26

## 2024-02-26 RX ORDER — LEVOTHYROXINE SODIUM 88 UG/1
TABLET ORAL
Qty: 90 TABLET | Refills: 1 | Status: SHIPPED | OUTPATIENT
Start: 2024-02-26

## 2024-03-07 DIAGNOSIS — R12 HEARTBURN: ICD-10-CM

## 2024-03-07 RX ORDER — FAMOTIDINE 20 MG/1
TABLET, FILM COATED ORAL
Qty: 180 TABLET | Refills: 1 | Status: SHIPPED | OUTPATIENT
Start: 2024-03-07

## 2024-06-06 DIAGNOSIS — R07.89 OTHER CHEST PAIN: ICD-10-CM

## 2024-06-06 RX ORDER — AMLODIPINE BESYLATE 2.5 MG/1
TABLET ORAL
Qty: 90 TABLET | Refills: 1 | Status: SHIPPED | OUTPATIENT
Start: 2024-06-06

## 2024-06-06 NOTE — TELEPHONE ENCOUNTER
Last visit: 12/21/23  Last Med refill: 9/11/23  Does patient have enough medication for 72 hours: No:     Next Visit Date:  Future Appointments   Date Time Provider Department Center   6/24/2024  1:00 PM Nicole Meade MD [id number]       Health Maintenance   Topic Date Due    Shingles vaccine (1 of 2) Never done    Low dose CT lung screening &/or counseling  Never done    Respiratory Syncytial Virus (RSV) Pregnant or age 60 yrs+ (1 - 1-dose 60+ series) Never done    COVID-19 Vaccine (4 - 2023-24 season) 09/01/2023    Colorectal Cancer Screen  06/17/2024    Depression Screen  06/23/2024    Lipids  06/29/2024    DTaP/Tdap/Td vaccine (1 - Tdap) 02/23/2025 (Originally 10/23/1981)    Flu vaccine (Season Ended) 02/23/2025 (Originally 8/1/2024)    Pneumococcal 0-64 years Vaccine (1 of 2 - PCV) 02/23/2025 (Originally 10/23/1968)    Breast cancer screen  11/03/2024    A1C test (Diabetic or Prediabetic)  12/21/2024    Hepatitis C screen  Completed    HIV screen  Completed    Hepatitis A vaccine  Aged Out    Hepatitis B vaccine  Aged Out    Hib vaccine  Aged Out    Polio vaccine  Aged Out    Meningococcal (ACWY) vaccine  Aged Out    Diabetes screen  Discontinued    Cervical cancer screen  Discontinued       Hemoglobin A1C (%)   Date Value   12/21/2023 4.9   04/09/2021 5.5   01/04/2021 5.6             ( goal A1C is < 7)   No components found for: \"LABMICR\"  No components found for: \"LDLCHOLESTEROL\", \"LDLCALC\"    (goal LDL is <100)   AST (U/L)   Date Value   06/29/2023 22     ALT (U/L)   Date Value   06/29/2023 19     BUN (mg/dL)   Date Value   06/29/2023 9     BP Readings from Last 3 Encounters:   12/21/23 128/80   06/23/23 120/82   12/21/22 110/72          (goal 120/80)    All Future Testing planned in [facility]  Lab Frequency Next Occurrence   CHARLIE DIGITAL DIAGNOSTIC W OR WO CAD RIGHT Once 11/20/2023               Patient Active Problem List:     Hypothyroidism     Hypercholesteremia     Allergic conjunctivitis

## 2024-08-02 ENCOUNTER — OFFICE VISIT (OUTPATIENT)
Dept: FAMILY MEDICINE CLINIC | Age: 64
End: 2024-08-02

## 2024-08-02 VITALS
TEMPERATURE: 97.2 F | SYSTOLIC BLOOD PRESSURE: 126 MMHG | OXYGEN SATURATION: 98 % | HEIGHT: 61 IN | HEART RATE: 71 BPM | WEIGHT: 129.4 LBS | BODY MASS INDEX: 24.43 KG/M2 | DIASTOLIC BLOOD PRESSURE: 82 MMHG

## 2024-08-02 DIAGNOSIS — M54.50 CHRONIC RIGHT-SIDED LOW BACK PAIN WITHOUT SCIATICA: ICD-10-CM

## 2024-08-02 DIAGNOSIS — R12 HEARTBURN: ICD-10-CM

## 2024-08-02 DIAGNOSIS — J30.1 SEASONAL ALLERGIC RHINITIS DUE TO POLLEN: ICD-10-CM

## 2024-08-02 DIAGNOSIS — J30.2 SEASONAL ALLERGIC RHINITIS, UNSPECIFIED TRIGGER: Primary | ICD-10-CM

## 2024-08-02 DIAGNOSIS — G89.29 CHRONIC RIGHT-SIDED LOW BACK PAIN WITHOUT SCIATICA: ICD-10-CM

## 2024-08-02 DIAGNOSIS — E78.00 HYPERCHOLESTEREMIA: ICD-10-CM

## 2024-08-02 DIAGNOSIS — R52 GENERALIZED PAIN: ICD-10-CM

## 2024-08-02 DIAGNOSIS — H10.13 ALLERGIC CONJUNCTIVITIS OF BOTH EYES: ICD-10-CM

## 2024-08-02 DIAGNOSIS — E03.9 HYPOTHYROIDISM, UNSPECIFIED TYPE: Chronic | ICD-10-CM

## 2024-08-02 RX ORDER — TIZANIDINE 2 MG/1
TABLET ORAL
Qty: 30 TABLET | Refills: 1 | Status: SHIPPED | OUTPATIENT
Start: 2024-08-02

## 2024-08-02 RX ORDER — CETIRIZINE HYDROCHLORIDE 10 MG/1
10 TABLET ORAL DAILY
Qty: 90 TABLET | Refills: 1 | Status: CANCELLED | OUTPATIENT
Start: 2024-08-02

## 2024-08-02 RX ORDER — FAMOTIDINE 20 MG/1
20 TABLET, FILM COATED ORAL 2 TIMES DAILY
Qty: 180 TABLET | Refills: 1 | Status: SHIPPED | OUTPATIENT
Start: 2024-08-02

## 2024-08-02 RX ORDER — LEVOCETIRIZINE DIHYDROCHLORIDE 5 MG/1
5 TABLET, FILM COATED ORAL NIGHTLY
Qty: 90 TABLET | Refills: 1 | Status: SHIPPED | OUTPATIENT
Start: 2024-08-02

## 2024-08-02 RX ORDER — MONTELUKAST SODIUM 10 MG/1
10 TABLET ORAL NIGHTLY
Qty: 90 TABLET | Refills: 1 | Status: SHIPPED | OUTPATIENT
Start: 2024-08-02

## 2024-08-02 RX ORDER — LEVOTHYROXINE SODIUM 88 UG/1
88 TABLET ORAL DAILY
Qty: 90 TABLET | Refills: 1 | Status: SHIPPED | OUTPATIENT
Start: 2024-08-02

## 2024-08-02 RX ORDER — NAPROXEN 500 MG/1
500 TABLET ORAL 2 TIMES DAILY WITH MEALS
Qty: 180 TABLET | Refills: 1 | Status: SHIPPED | OUTPATIENT
Start: 2024-08-02

## 2024-08-02 RX ORDER — ATORVASTATIN CALCIUM 10 MG/1
TABLET, FILM COATED ORAL
Qty: 90 TABLET | Refills: 1 | Status: SHIPPED | OUTPATIENT
Start: 2024-08-02

## 2024-08-02 SDOH — ECONOMIC STABILITY: INCOME INSECURITY: HOW HARD IS IT FOR YOU TO PAY FOR THE VERY BASICS LIKE FOOD, HOUSING, MEDICAL CARE, AND HEATING?: SOMEWHAT HARD

## 2024-08-02 SDOH — ECONOMIC STABILITY: FOOD INSECURITY: WITHIN THE PAST 12 MONTHS, YOU WORRIED THAT YOUR FOOD WOULD RUN OUT BEFORE YOU GOT MONEY TO BUY MORE.: NEVER TRUE

## 2024-08-02 SDOH — ECONOMIC STABILITY: FOOD INSECURITY: WITHIN THE PAST 12 MONTHS, THE FOOD YOU BOUGHT JUST DIDN'T LAST AND YOU DIDN'T HAVE MONEY TO GET MORE.: NEVER TRUE

## 2024-08-02 ASSESSMENT — PATIENT HEALTH QUESTIONNAIRE - PHQ9
SUM OF ALL RESPONSES TO PHQ QUESTIONS 1-9: 0
1. LITTLE INTEREST OR PLEASURE IN DOING THINGS: NOT AT ALL
2. FEELING DOWN, DEPRESSED OR HOPELESS: NOT AT ALL
SUM OF ALL RESPONSES TO PHQ9 QUESTIONS 1 & 2: 0

## 2024-08-02 ASSESSMENT — ENCOUNTER SYMPTOMS
SHORTNESS OF BREATH: 0
COUGH: 0

## 2024-08-02 NOTE — TELEPHONE ENCOUNTER
Last visit: 12/21/2023  Last Med refill: 05/02/2024  Does patient have enough medication for 72 hours: No:     Next Visit Date:  Future Appointments   Date Time Provider Department Center   8/2/2024 10:45 AM Jonathan Lindsey, APRN - CNP [facility] DEP       Health Maintenance   Topic Date Due    Shingles vaccine (1 of 2) Never done    Lung Cancer Screening &/or Counseling  Never done    Respiratory Syncytial Virus (RSV) Pregnant or age 60 yrs+ (1 - 1-dose 60+ series) Never done    COVID-19 Vaccine (4 - 2023-24 season) 09/01/2023    Colorectal Cancer Screen  06/17/2024    Depression Screen  06/23/2024    Lipids  06/29/2024    Flu vaccine (1) Never done    DTaP/Tdap/Td vaccine (1 - Tdap) 02/23/2025 (Originally 10/23/1981)    Pneumococcal 0-64 years Vaccine (1 of 2 - PCV) 02/23/2025 (Originally 10/23/1968)    Breast cancer screen  11/03/2024    A1C test (Diabetic or Prediabetic)  12/21/2024    Hepatitis C screen  Completed    HIV screen  Completed    Hepatitis A vaccine  Aged Out    Hepatitis B vaccine  Aged Out    Hib vaccine  Aged Out    Polio vaccine  Aged Out    Meningococcal (ACWY) vaccine  Aged Out    Diabetes screen  Discontinued    Cervical cancer screen  Discontinued       Hemoglobin A1C (%)   Date Value   12/21/2023 4.9   04/09/2021 5.5   01/04/2021 5.6             ( goal A1C is < 7)   No components found for: \"LABMICR\"  No components found for: \"LDLCHOLESTEROL\", \"LDLCALC\"    (goal LDL is <100)   AST (U/L)   Date Value   06/29/2023 22     ALT (U/L)   Date Value   06/29/2023 19     BUN (mg/dL)   Date Value   06/29/2023 9     BP Readings from Last 3 Encounters:   12/21/23 128/80   06/23/23 120/82   12/21/22 110/72          (goal 120/80)    All Future Testing planned in [facility]  Lab Frequency Next Occurrence   CHARLIE DIGITAL DIAGNOSTIC W OR WO CAD RIGHT Once 11/20/2023               Patient Active Problem List:     Hypothyroidism     Hypercholesteremia     Allergic conjunctivitis of both eyes

## 2024-08-02 NOTE — PROGRESS NOTES
Tami Snyder (:  1960) is a 63 y.o. female,Established patient, here for evaluation of the following chief complaint(s):  Hypothyroidism (F/u), Hyperlipidemia (F/u), and Headache (Getting headache almost everyday, was told once it was from her allergies, wondering if she needs a new allergy pill, headache is on thew top of head )      Assessment & Plan   ASSESSMENT/PLAN:  1. Seasonal allergic rhinitis, unspecified trigger  Switch zyrtec to xyzal, suggest she take at night   -     levocetirizine (XYZAL ALLERGY 24HR) 5 MG tablet; Take 1 tablet by mouth nightly, Disp-90 tablet, R-1Normal  2. Allergic conjunctivitis of both eyes  3. Heartburn  -     famotidine (PEPCID) 20 MG tablet; Take 1 tablet by mouth 2 times daily, Disp-180 tablet, R-1Normal  4. Hypothyroidism, unspecified type    -     levothyroxine (SYNTHROID) 88 MCG tablet; Take 1 tablet by mouth daily, Disp-90 tablet, R-1Normal  5. Seasonal allergic rhinitis due to pollen  -     montelukast (SINGULAIR) 10 MG tablet; Take 1 tablet by mouth nightly, Disp-90 tablet, R-1Normal  6. Generalized pain  -     naproxen (NAPROSYN) 500 MG tablet; Take 1 tablet by mouth 2 times daily (with meals), Disp-180 tablet, R-1Normal  7. Chronic right-sided low back pain without sciatica  -     tiZANidine (ZANAFLEX) 2 MG tablet; Take one tablet by mouth three times a day as needed for back pain., Disp-30 tablet, R-1Normal      No follow-ups on file.         Subjective   SUBJECTIVE/OBJECTIVE:  HPI  Pt states she was told her allergies cause her headaches. She is taking zyrtec and singular, she is not sure it helps. States the headache is at the top of her ehad.  Goes away in the evening. She is taking naproxen for the headaches when its bad, it does help it go away.  Frequent throat clearing. Always feels like she has pnd. Doesn't get headaches during the winter, states allergies bother her more in the spring and summer.  Doesn't want to see an allergist.       Review of

## 2024-08-29 DIAGNOSIS — H10.13 ALLERGIC CONJUNCTIVITIS OF BOTH EYES: ICD-10-CM

## 2024-08-29 RX ORDER — CETIRIZINE HYDROCHLORIDE 10 MG/1
10 TABLET ORAL DAILY
Qty: 90 TABLET | Refills: 1 | Status: SHIPPED | OUTPATIENT
Start: 2024-08-29

## 2024-08-29 NOTE — TELEPHONE ENCOUNTER
Patient contacted office. Stated that the new allergy medication that she is supposed to be taking nightly is not helping. She is asking if she can go back on her previous allergy medication. Medication pended for refill

## 2024-09-13 DIAGNOSIS — R07.89 OTHER CHEST PAIN: ICD-10-CM

## 2024-09-13 DIAGNOSIS — E78.00 HYPERCHOLESTEREMIA: ICD-10-CM

## 2024-09-13 RX ORDER — ATORVASTATIN CALCIUM 10 MG/1
TABLET, FILM COATED ORAL
Qty: 90 TABLET | Refills: 1 | Status: SHIPPED | OUTPATIENT
Start: 2024-09-13

## 2024-09-13 RX ORDER — AMLODIPINE BESYLATE 2.5 MG/1
TABLET ORAL
Qty: 90 TABLET | Refills: 1 | Status: SHIPPED | OUTPATIENT
Start: 2024-09-13

## 2024-10-31 DIAGNOSIS — G89.29 CHRONIC RIGHT-SIDED LOW BACK PAIN WITHOUT SCIATICA: ICD-10-CM

## 2024-10-31 DIAGNOSIS — M54.50 CHRONIC RIGHT-SIDED LOW BACK PAIN WITHOUT SCIATICA: ICD-10-CM

## 2024-11-01 NOTE — TELEPHONE ENCOUNTER
Tami Snyder is calling to request a refill on the following medication(s):    Last Visit Date (If Applicable):  8/2/2024    Next Visit Date:    Visit date not found    Medication Request:  Requested Prescriptions     Pending Prescriptions Disp Refills    tiZANidine (ZANAFLEX) 2 MG tablet [Pharmacy Med Name: TIZANIDINE HCL 2 MG TABLET] 30 tablet 1     Sig: TAKE 1 TABLET BY MOUTH 3 TIMES A DAY AS NEEDED FOR BACK PAIN

## 2024-11-02 RX ORDER — TIZANIDINE 2 MG/1
TABLET ORAL
Qty: 30 TABLET | Refills: 1 | Status: SHIPPED | OUTPATIENT
Start: 2024-11-02

## 2024-11-09 ENCOUNTER — HOSPITAL ENCOUNTER (OUTPATIENT)
Dept: WOMENS IMAGING | Age: 64
Discharge: HOME | End: 2024-11-11
Payer: MEDICAID

## 2024-11-09 DIAGNOSIS — Z12.31 OTHER SCREENING MAMMOGRAM: ICD-10-CM

## 2024-11-09 PROCEDURE — 77063 BREAST TOMOSYNTHESIS BI: CPT

## 2025-01-09 ENCOUNTER — OFFICE VISIT (OUTPATIENT)
Dept: FAMILY MEDICINE CLINIC | Age: 65
End: 2025-01-09

## 2025-01-09 VITALS
WEIGHT: 130.5 LBS | SYSTOLIC BLOOD PRESSURE: 120 MMHG | BODY MASS INDEX: 25.07 KG/M2 | DIASTOLIC BLOOD PRESSURE: 80 MMHG | HEART RATE: 106 BPM | TEMPERATURE: 99 F | OXYGEN SATURATION: 95 %

## 2025-01-09 DIAGNOSIS — Z12.11 SCREENING FOR COLON CANCER: ICD-10-CM

## 2025-01-09 DIAGNOSIS — J20.9 ACUTE BRONCHITIS, UNSPECIFIED ORGANISM: Primary | ICD-10-CM

## 2025-01-09 DIAGNOSIS — R68.89 FLU-LIKE SYMPTOMS: ICD-10-CM

## 2025-01-09 DIAGNOSIS — E03.9 ACQUIRED HYPOTHYROIDISM: Chronic | ICD-10-CM

## 2025-01-09 DIAGNOSIS — E55.9 VITAMIN D DEFICIENCY: ICD-10-CM

## 2025-01-09 DIAGNOSIS — Z13.0 SCREENING, ANEMIA, DEFICIENCY, IRON: ICD-10-CM

## 2025-01-09 DIAGNOSIS — Z87.891 PERSONAL HISTORY OF TOBACCO USE: ICD-10-CM

## 2025-01-09 DIAGNOSIS — E78.00 HYPERCHOLESTEREMIA: ICD-10-CM

## 2025-01-09 DIAGNOSIS — R73.9 ELEVATED BLOOD SUGAR: ICD-10-CM

## 2025-01-09 LAB
EXP DATE SOLUTION: NORMAL
EXP DATE SWAB: NORMAL
EXPIRATION DATE: NORMAL
INFLUENZA A RNA, POC: NORMAL
INFLUENZA B RNA, POC: NORMAL
LOT NUMBER POC: NORMAL
LOT NUMBER SOLUTION: NORMAL
LOT NUMBER SWAB: NORMAL
SARS-COV-2 RNA, POC: NEGATIVE
VALID INTERNAL CONTROL: PRESENT

## 2025-01-09 RX ORDER — ALBUTEROL SULFATE 90 UG/1
2 INHALANT RESPIRATORY (INHALATION) EVERY 4 HOURS PRN
Qty: 18 G | Refills: 0 | Status: SHIPPED | OUTPATIENT
Start: 2025-01-09 | End: 2026-01-09

## 2025-01-09 RX ORDER — PREDNISONE 10 MG/1
TABLET ORAL
Qty: 30 TABLET | Refills: 0 | Status: SHIPPED | OUTPATIENT
Start: 2025-01-09

## 2025-01-09 SDOH — ECONOMIC STABILITY: FOOD INSECURITY: WITHIN THE PAST 12 MONTHS, YOU WORRIED THAT YOUR FOOD WOULD RUN OUT BEFORE YOU GOT MONEY TO BUY MORE.: NEVER TRUE

## 2025-01-09 SDOH — ECONOMIC STABILITY: FOOD INSECURITY: WITHIN THE PAST 12 MONTHS, THE FOOD YOU BOUGHT JUST DIDN'T LAST AND YOU DIDN'T HAVE MONEY TO GET MORE.: NEVER TRUE

## 2025-01-09 ASSESSMENT — PATIENT HEALTH QUESTIONNAIRE - PHQ9
SUM OF ALL RESPONSES TO PHQ9 QUESTIONS 1 & 2: 0
SUM OF ALL RESPONSES TO PHQ QUESTIONS 1-9: 0
1. LITTLE INTEREST OR PLEASURE IN DOING THINGS: NOT AT ALL
SUM OF ALL RESPONSES TO PHQ QUESTIONS 1-9: 0
SUM OF ALL RESPONSES TO PHQ QUESTIONS 1-9: 0
2. FEELING DOWN, DEPRESSED OR HOPELESS: NOT AT ALL
SUM OF ALL RESPONSES TO PHQ QUESTIONS 1-9: 0

## 2025-01-09 NOTE — PROGRESS NOTES
Discussed with the patient the current USPSTF guidelines released March 9, 2021 for screening for lung cancer.    For adults aged 50 to 80 years who have a 20 pack-year smoking history and currently smoke or have quit within the past 15 years the grade B recommendation is to:  Screen for lung cancer with low-dose computed tomography (LDCT) every year.  Stop screening once a person has not smoked for 15 years or has a health problem that limits life expectancy or the ability to have lung surgery.    The patient  reports that she has been smoking cigarettes. She started smoking about 37 years ago. She has a 27.3 pack-year smoking history. She has never used smokeless tobacco.. Discussed with patient the risks and benefits of screening, including over-diagnosis, false positive rate, and total radiation exposure.  The patient currently exhibits no signs or symptoms suggestive of lung cancer.  Discussed with patient the importance of compliance with yearly annual lung cancer screenings and willingness to undergo diagnosis and treatment if screening scan is positive.  In addition, the patient was counseled regarding the importance of remaining smoke free and/or total smoking cessation.    Also reviewed the following if the patient has Medicare that as of February 10, 2022, Medicare only covers LDCT screening in patients aged 50-77 with at least a 20 pack-year smoking history who currently smoke or have quit in the last 15 years

## 2025-01-09 NOTE — PROGRESS NOTES
[facility]  [facility]  [address] [address]  Dept: [phone]  Dept Fax: [phone]      Tami Snyder is a 64 y.o. female who presents today for hermedical conditions/complaints as noted below.  Tami Snyder is c/o of Cough (Started Friday 5/6 days ago, no fever, coughing up a yellowish mucus, headache body aches, teeth hurt, eyes hurt, chest tightness and heaviness, using inhaler more than usual, sore throat, runny nose, no vomiting, no diarrhea ) and Congestion (Has tried OTC cold and allergy, Benadryl, Nyquil )        Assessment/Plan:     1. Acute bronchitis, unspecified organism  -     albuterol sulfate HFA (VENTOLIN HFA) 108 (90 Base) MCG/ACT inhaler; Inhale 2 puffs into the lungs every 4 hours as needed for Wheezing, Disp-18 g, R-0Print  -     predniSONE (DELTASONE) 10 MG tablet; 4 tabs by mouth daily for 3 days, then 3 tabs daily for 3 days, then 2 tabs daily for 3 days, then 1 tab daily till gone., Disp-30 tablet, R-0Print  2. Personal history of tobacco use  -     OK VISIT TO DISCUSS LUNG CA SCREEN W LDCT  -     CT Lung Screen (Initial/Annual/Baseline); Future  3. Screening for colon cancer  -     Fecal DNA Colorectal cancer screening (Cologuard)  4. Flu-like symptoms  -     AMB POC COVID-19 & INFLUENZA A/B  5. Hypercholesteremia  -     Lipid, Fasting; Future  -     Comprehensive Metabolic Panel; Future  6. Acquired hypothyroidism  -     TSH reflex to FT4; Future  7. Screening, anemia, deficiency, iron  -     CBC with Auto Differential; Future  8. Vitamin D deficiency  -     Vitamin D 25 Hydroxy; Future  9. Elevated blood sugar  -     Hemoglobin A1C; Future          No follow-ups on file.      HPI     Cough  This is a new problem. The current episode started in the past 7 days. The problem has been gradually worsening. The problem occurs constantly. Associated symptoms include nasal congestion, postnasal drip, rhinorrhea, a sore throat, shortness of

## 2025-01-15 DIAGNOSIS — R07.89 OTHER CHEST PAIN: ICD-10-CM

## 2025-01-15 DIAGNOSIS — E03.9 HYPOTHYROIDISM, UNSPECIFIED TYPE: Chronic | ICD-10-CM

## 2025-01-15 DIAGNOSIS — J30.1 SEASONAL ALLERGIC RHINITIS DUE TO POLLEN: ICD-10-CM

## 2025-01-15 RX ORDER — AMLODIPINE BESYLATE 2.5 MG/1
TABLET ORAL
Qty: 90 TABLET | Refills: 1 | OUTPATIENT
Start: 2025-01-15

## 2025-01-15 NOTE — TELEPHONE ENCOUNTER
Last visit: 1/9/25  Last Med refill: 8/2/24  Does patient have enough medication for 72 hours: Yes    Next Visit Date:  Future Appointments   Date Time Provider Department Center   3/12/2025  1:00 PM Nicole Meade MD [facility]       Health Maintenance   Topic Date Due    Shingles vaccine (1 of 2) Never done    Lung Cancer Screening &/or Counseling  Never done    Colorectal Cancer Screen  06/17/2024    Lipids  06/29/2024    Flu vaccine (1) Never done    COVID-19 Vaccine (4 - 2023-24 season) 09/01/2024    A1C test (Diabetic or Prediabetic)  12/21/2024    DTaP/Tdap/Td vaccine (1 - Tdap) 02/23/2025 (Originally 10/23/1981)    Pneumococcal 0-64 years Vaccine (1 of 2 - PCV) 02/23/2025 (Originally 10/23/1968)    Breast cancer screen  11/09/2025    Depression Screen  01/09/2026    Respiratory Syncytial Virus (RSV) Pregnant or age 60 yrs+ (1 - 1-dose 75+ series) 10/23/2037    Hepatitis C screen  Completed    HIV screen  Completed    Hepatitis A vaccine  Aged Out    Hepatitis B vaccine  Aged Out    Hib vaccine  Aged Out    Polio vaccine  Aged Out    Meningococcal (ACWY) vaccine  Aged Out    Diabetes screen  Discontinued    Cervical cancer screen  Discontinued       Hemoglobin A1C (%)   Date Value   12/21/2023 4.9   04/09/2021 5.5   01/04/2021 5.6             ( goal A1C is < 7)   No components found for: \"LABMICR\"  No components found for: \"LDLCHOLESTEROL\", \"LDLCALC\"    (goal LDL is <100)   AST (U/L)   Date Value   06/29/2023 22     ALT (U/L)   Date Value   06/29/2023 19     BUN (mg/dL)   Date Value   06/29/2023 9     BP Readings from Last 3 Encounters:   01/09/25 120/80   08/02/24 126/82   12/21/23 128/80          (goal 120/80)    All Future Testing planned in [facility]  Lab Frequency Next Occurrence   CT Lung Screen (Initial/Annual/Baseline) Once 01/09/2025   Lipid, Fasting Once 01/09/2025   CBC with Auto Differential Once 01/09/2025   Comprehensive Metabolic Panel Once 01/09/2025   TSH reflex to FT4 Once

## 2025-01-16 RX ORDER — LEVOTHYROXINE SODIUM 88 UG/1
88 TABLET ORAL DAILY
Qty: 90 TABLET | Refills: 1 | Status: SHIPPED | OUTPATIENT
Start: 2025-01-16

## 2025-01-16 RX ORDER — MONTELUKAST SODIUM 10 MG/1
10 TABLET ORAL NIGHTLY
Qty: 90 TABLET | Refills: 1 | Status: SHIPPED | OUTPATIENT
Start: 2025-01-16

## 2025-03-12 DIAGNOSIS — R07.89 OTHER CHEST PAIN: ICD-10-CM

## 2025-03-13 RX ORDER — AMLODIPINE BESYLATE 2.5 MG/1
2.5 TABLET ORAL DAILY
Qty: 90 TABLET | Refills: 1 | Status: SHIPPED | OUTPATIENT
Start: 2025-03-13

## 2025-03-13 NOTE — TELEPHONE ENCOUNTER
Last visit: 01/09/2025  Last Med refill: 12/13/2024  Does patient have enough medication for 72 hours: no    Next Visit Date:  Future Appointments   Date Time Provider Department Center   3/24/2025 10:45 AM Nicole Meade MD [facility]       Health Maintenance   Topic Date Due    DTaP/Tdap/Td vaccine (1 - Tdap) Never done    Pneumococcal 50+ years Vaccine (1 of 2 - PCV) Never done    Shingles vaccine (1 of 2) Never done    Lung Cancer Screening &/or Counseling  Never done    Colorectal Cancer Screen  06/17/2024    Lipids  06/29/2024    Flu vaccine (1) Never done    COVID-19 Vaccine (4 - 2024-25 season) 09/01/2024    A1C test (Diabetic or Prediabetic)  12/21/2024    Breast cancer screen  11/09/2025    Depression Screen  01/09/2026    Respiratory Syncytial Virus (RSV) Pregnant or age 60 yrs+ (1 - 1-dose 75+ series) 10/23/2037    Hepatitis C screen  Completed    HIV screen  Completed    Hepatitis A vaccine  Aged Out    Hepatitis B vaccine  Aged Out    Hib vaccine  Aged Out    Polio vaccine  Aged Out    Meningococcal (ACWY) vaccine  Aged Out    Meningococcal B vaccine  Aged Out    Diabetes screen  Discontinued    Cervical cancer screen  Discontinued       Hemoglobin A1C (%)   Date Value   12/21/2023 4.9   04/09/2021 5.5   01/04/2021 5.6             ( goal A1C is < 7)   No components found for: \"LABMICR\"  No components found for: \"LDLCHOLESTEROL\", \"LDLCALC\"    (goal LDL is <100)   AST (U/L)   Date Value   06/29/2023 22     ALT (U/L)   Date Value   06/29/2023 19     BUN (mg/dL)   Date Value   06/29/2023 9     BP Readings from Last 3 Encounters:   01/09/25 120/80   08/02/24 126/82   12/21/23 128/80          (goal 120/80)    All Future Testing planned in [facility]  Lab Frequency Next Occurrence   CT Lung Screen (Initial/Annual/Baseline) Once 01/09/2025   Lipid, Fasting Once 01/09/2025   CBC with Auto Differential Once 01/09/2025   Comprehensive Metabolic Panel Once 01/09/2025   TSH reflex to FT4 Once

## 2025-03-21 ENCOUNTER — HOSPITAL ENCOUNTER (OUTPATIENT)
Age: 65
Setting detail: SPECIMEN
Discharge: HOME | End: 2025-03-21

## 2025-03-21 DIAGNOSIS — E55.9 VITAMIN D DEFICIENCY: ICD-10-CM

## 2025-03-21 DIAGNOSIS — E78.00 HYPERCHOLESTEREMIA: ICD-10-CM

## 2025-03-21 DIAGNOSIS — E03.9 ACQUIRED HYPOTHYROIDISM: Chronic | ICD-10-CM

## 2025-03-21 DIAGNOSIS — R73.9 ELEVATED BLOOD SUGAR: ICD-10-CM

## 2025-03-21 DIAGNOSIS — Z13.0 SCREENING, ANEMIA, DEFICIENCY, IRON: ICD-10-CM

## 2025-03-21 LAB
25(OH)D3 SERPL-MCNC: 21.7 NG/ML (ref 30–100)
ALBUMIN SERPL-MCNC: 4.4 G/DL (ref 3.5–5.2)
ALBUMIN/GLOB SERPL: 1.5 {RATIO} (ref 1–2.5)
ALP SERPL-CCNC: 60 U/L (ref 35–104)
ALT SERPL-CCNC: 18 U/L (ref 10–35)
ANION GAP SERPL CALCULATED.3IONS-SCNC: 13 MMOL/L (ref 9–16)
AST SERPL-CCNC: 29 U/L (ref 10–35)
BASOPHILS # BLD: 0.11 K/UL (ref 0–0.2)
BASOPHILS NFR BLD: 1 % (ref 0–2)
BILIRUB SERPL-MCNC: 1.4 MG/DL (ref 0–1.2)
BUN SERPL-MCNC: 9 MG/DL (ref 8–23)
CALCIUM SERPL-MCNC: 9.4 MG/DL (ref 8.6–10.4)
CHLORIDE SERPL-SCNC: 100 MMOL/L (ref 98–107)
CHOLEST SERPL-MCNC: 212 MG/DL (ref 0–199)
CHOLESTEROL/HDL RATIO: 2.7
CO2 SERPL-SCNC: 22 MMOL/L (ref 20–31)
CREAT SERPL-MCNC: 0.6 MG/DL (ref 0.6–0.9)
EOSINOPHIL # BLD: 0.07 K/UL (ref 0–0.44)
EOSINOPHILS RELATIVE PERCENT: 1 % (ref 1–4)
ERYTHROCYTE [DISTWIDTH] IN BLOOD BY AUTOMATED COUNT: 14.7 % (ref 11.8–14.4)
EST. AVERAGE GLUCOSE BLD GHB EST-MCNC: 100 MG/DL
GFR, ESTIMATED: >90 ML/MIN/1.73M2
GLUCOSE SERPL-MCNC: 79 MG/DL (ref 74–99)
HBA1C MFR BLD: 5.1 % (ref 4–6)
HCT VFR BLD AUTO: 47.1 % (ref 36.3–47.1)
HDLC SERPL-MCNC: 79 MG/DL
HGB BLD-MCNC: 15.7 G/DL (ref 11.9–15.1)
IMM GRANULOCYTES # BLD AUTO: <0.03 K/UL (ref 0–0.3)
IMM GRANULOCYTES NFR BLD: 0 %
LDLC SERPL CALC-MCNC: 109 MG/DL (ref 0–100)
LYMPHOCYTES NFR BLD: 1.7 K/UL (ref 1.1–3.7)
LYMPHOCYTES RELATIVE PERCENT: 18 % (ref 24–43)
MCH RBC QN AUTO: 32.5 PG (ref 25.2–33.5)
MCHC RBC AUTO-ENTMCNC: 33.3 G/DL (ref 28.4–34.8)
MCV RBC AUTO: 97.5 FL (ref 82.6–102.9)
MONOCYTES NFR BLD: 0.72 K/UL (ref 0.1–1.2)
MONOCYTES NFR BLD: 7 % (ref 3–12)
NEUTROPHILS NFR BLD: 73 % (ref 36–65)
NEUTS SEG NFR BLD: 7.07 K/UL (ref 1.5–8.1)
NRBC BLD-RTO: 0 PER 100 WBC
PLATELET # BLD AUTO: 213 K/UL (ref 138–453)
PMV BLD AUTO: 10 FL (ref 8.1–13.5)
POTASSIUM SERPL-SCNC: 5.1 MMOL/L (ref 3.7–5.3)
PROT SERPL-MCNC: 7.3 G/DL (ref 6.6–8.7)
RBC # BLD AUTO: 4.83 M/UL (ref 3.95–5.11)
RBC # BLD: ABNORMAL 10*6/UL
SODIUM SERPL-SCNC: 135 MMOL/L (ref 136–145)
TRIGL SERPL-MCNC: 121 MG/DL (ref 0–149)
TSH SERPL DL<=0.05 MIU/L-ACNC: 1.42 UIU/ML (ref 0.27–4.2)
VLDLC SERPL CALC-MCNC: 24 MG/DL (ref 1–30)
WBC OTHER # BLD: 9.7 K/UL (ref 3.5–11.3)

## 2025-03-24 ENCOUNTER — RESULTS FOLLOW-UP (OUTPATIENT)
Dept: FAMILY MEDICINE CLINIC | Age: 65
End: 2025-03-24

## 2025-03-24 ENCOUNTER — OFFICE VISIT (OUTPATIENT)
Dept: FAMILY MEDICINE CLINIC | Age: 65
End: 2025-03-24
Payer: MEDICAID

## 2025-03-24 VITALS
TEMPERATURE: 97.4 F | BODY MASS INDEX: 25.28 KG/M2 | DIASTOLIC BLOOD PRESSURE: 80 MMHG | HEART RATE: 79 BPM | SYSTOLIC BLOOD PRESSURE: 124 MMHG | OXYGEN SATURATION: 99 % | WEIGHT: 131.6 LBS

## 2025-03-24 DIAGNOSIS — R12 HEARTBURN: ICD-10-CM

## 2025-03-24 DIAGNOSIS — G89.29 CHRONIC RIGHT-SIDED LOW BACK PAIN WITHOUT SCIATICA: ICD-10-CM

## 2025-03-24 DIAGNOSIS — J30.2 SEASONAL ALLERGIC RHINITIS, UNSPECIFIED TRIGGER: ICD-10-CM

## 2025-03-24 DIAGNOSIS — H10.13 ALLERGIC CONJUNCTIVITIS OF BOTH EYES: ICD-10-CM

## 2025-03-24 DIAGNOSIS — F33.8 SEASONAL AFFECTIVE DISORDER: ICD-10-CM

## 2025-03-24 DIAGNOSIS — E55.9 VITAMIN D DEFICIENCY: ICD-10-CM

## 2025-03-24 DIAGNOSIS — J44.9 CHRONIC OBSTRUCTIVE PULMONARY DISEASE, UNSPECIFIED COPD TYPE (HCC): Primary | ICD-10-CM

## 2025-03-24 DIAGNOSIS — R52 GENERALIZED PAIN: ICD-10-CM

## 2025-03-24 DIAGNOSIS — E78.00 HYPERCHOLESTEREMIA: ICD-10-CM

## 2025-03-24 DIAGNOSIS — M54.50 CHRONIC RIGHT-SIDED LOW BACK PAIN WITHOUT SCIATICA: ICD-10-CM

## 2025-03-24 PROCEDURE — 99214 OFFICE O/P EST MOD 30 MIN: CPT | Performed by: INTERNAL MEDICINE

## 2025-03-24 RX ORDER — FLUTICASONE PROPIONATE 50 MCG
SPRAY, SUSPENSION (ML) NASAL
Qty: 16 G | Refills: 5 | Status: SHIPPED | OUTPATIENT
Start: 2025-03-24

## 2025-03-24 RX ORDER — ERGOCALCIFEROL 1.25 MG/1
50000 CAPSULE, LIQUID FILLED ORAL WEEKLY
Qty: 12 CAPSULE | Refills: 0 | Status: SHIPPED | OUTPATIENT
Start: 2025-03-24

## 2025-03-24 RX ORDER — NAPROXEN 500 MG/1
500 TABLET ORAL 2 TIMES DAILY WITH MEALS
Qty: 180 TABLET | Refills: 1 | Status: SHIPPED | OUTPATIENT
Start: 2025-03-24

## 2025-03-24 RX ORDER — ATORVASTATIN CALCIUM 10 MG/1
TABLET, FILM COATED ORAL
Qty: 90 TABLET | Refills: 1 | Status: SHIPPED | OUTPATIENT
Start: 2025-03-24

## 2025-03-24 RX ORDER — ALBUTEROL SULFATE 90 UG/1
2 INHALANT RESPIRATORY (INHALATION) EVERY 4 HOURS PRN
Qty: 18 G | Refills: 0 | Status: SHIPPED | OUTPATIENT
Start: 2025-03-24 | End: 2026-03-24

## 2025-03-24 RX ORDER — CETIRIZINE HYDROCHLORIDE 10 MG/1
10 TABLET ORAL DAILY
Qty: 90 TABLET | Refills: 1 | Status: SHIPPED | OUTPATIENT
Start: 2025-03-24

## 2025-03-24 RX ORDER — TIZANIDINE 2 MG/1
TABLET ORAL
Qty: 30 TABLET | Refills: 1 | Status: SHIPPED | OUTPATIENT
Start: 2025-03-24

## 2025-03-24 RX ORDER — LEVOCETIRIZINE DIHYDROCHLORIDE 5 MG/1
5 TABLET, FILM COATED ORAL NIGHTLY
Qty: 90 TABLET | Refills: 1 | Status: SHIPPED | OUTPATIENT
Start: 2025-03-24

## 2025-03-24 RX ORDER — UMECLIDINIUM 62.5 UG/1
1 AEROSOL, POWDER ORAL DAILY
Qty: 1 EACH | Refills: 5 | Status: SHIPPED | OUTPATIENT
Start: 2025-03-24

## 2025-03-24 RX ORDER — FAMOTIDINE 20 MG/1
20 TABLET, FILM COATED ORAL 2 TIMES DAILY
Qty: 180 TABLET | Refills: 1 | Status: SHIPPED | OUTPATIENT
Start: 2025-03-24

## 2025-03-24 RX ORDER — CETIRIZINE HYDROCHLORIDE 10 MG/1
10 TABLET ORAL DAILY
Qty: 90 TABLET | Refills: 1 | OUTPATIENT
Start: 2025-03-24

## 2025-03-24 ASSESSMENT — ENCOUNTER SYMPTOMS
BLOOD IN STOOL: 0
CONSTIPATION: 0
CHOKING: 0
COUGH: 0
CHEST TIGHTNESS: 0
WHEEZING: 0
ABDOMINAL PAIN: 0
SHORTNESS OF BREATH: 0
ANAL BLEEDING: 0
DIARRHEA: 0
VOMITING: 0
NAUSEA: 0

## 2025-03-24 ASSESSMENT — VISUAL ACUITY: OU: 1

## 2025-03-24 NOTE — PROGRESS NOTES
[facility]  [place] [address]  Dept: [phone]  Dept Fax: [phone]      Tami Snyder is a 64 y.o. female who presents today for hermedical conditions/complaints as noted below.  Tami Snyder is c/o of Hypothyroidism, Hyperlipidemia (F/u), and Results (Discuss labs from 03/21/2025)        Assessment/Plan:     1. Chronic obstructive pulmonary disease, unspecified COPD type (HCC)  -     umeclidinium bromide (INCRUSE ELLIPTA) 62.5 MCG/ACT inhaler; Inhale 1 puff into the lungs daily, Disp-1 each, R-5Normal  -     albuterol sulfate HFA (VENTOLIN HFA) 108 (90 Base) MCG/ACT inhaler; Inhale 2 puffs into the lungs every 4 hours as needed for Wheezing, Disp-18 g, R-0Normal  2. Hypercholesteremia  -     atorvastatin (LIPITOR) 10 MG tablet; take 1 tablet by mouth once daily, Disp-90 tablet, R-1Normal  3. Allergic conjunctivitis of both eyes  -     cetirizine (ZYRTEC) 10 MG tablet; Take 1 tablet by mouth daily, Disp-90 tablet, R-1Normal  -     fluticasone (FLONASE) 50 MCG/ACT nasal spray; instill 1 spray into each nostril once daily, Disp-16 g, R-5Normal  4. Heartburn  -     famotidine (PEPCID) 20 MG tablet; Take 1 tablet by mouth 2 times daily, Disp-180 tablet, R-1Normal  5. Seasonal allergic rhinitis, unspecified trigger  -     levocetirizine (XYZAL ALLERGY 24HR) 5 MG tablet; Take 1 tablet by mouth nightly, Disp-90 tablet, R-1Normal  6. Generalized pain  -     naproxen (NAPROSYN) 500 MG tablet; Take 1 tablet by mouth 2 times daily (with meals), Disp-180 tablet, R-1Normal  7. Chronic right-sided low back pain without sciatica  -     tiZANidine (ZANAFLEX) 2 MG tablet; TAKE 1 TABLET BY MOUTH 3 TIMES A DAY AS NEEDED FOR BACK PAIN, Disp-30 tablet, R-1Normal  8. Vitamin D deficiency  -     vitamin D (ERGOCALCIFEROL) 1.25 MG (95685 UT) CAPS capsule; Take 1 capsule by mouth once a week, Disp-12 capsule, R-0Normal  9. Seasonal affective disorder  Comments:  pt to

## 2025-03-24 NOTE — PATIENT INSTRUCTIONS
Switch out your pillowcase at least twice a week, and especially when you've washed your hair  Wear a mask when vacuuming or sweeping the house   Consider getting allergy-friendly pillowcases and bedding   Keep pets away from your bed and train them to stay away from your bed if possible.   If possible, cover your bed with a sheet during the day and remove it at night when going to bed to prevent dust, pollen, dander, etc. from landing on your bed all day.